# Patient Record
Sex: MALE | Race: WHITE | Employment: FULL TIME | URBAN - METROPOLITAN AREA
[De-identification: names, ages, dates, MRNs, and addresses within clinical notes are randomized per-mention and may not be internally consistent; named-entity substitution may affect disease eponyms.]

---

## 2020-06-12 ENCOUNTER — HOSPITAL ENCOUNTER (OUTPATIENT)
Dept: PREADMISSION TESTING | Age: 60
Discharge: HOME | End: 2020-06-12
Payer: COMMERCIAL

## 2020-06-12 DIAGNOSIS — M47.22 CERVICAL SPONDYLOSIS WITH RADICULOPATHY: ICD-10-CM

## 2020-06-12 LAB
ALBUMIN SERPL-MCNC: 3.7 G/DL (ref 3.4–5)
ALBUMIN/GLOB SERPL: 1.4 {RATIO} (ref 0.8–1.7)
ALP SERPL-CCNC: 59 U/L (ref 45–117)
ALT SERPL-CCNC: 35 U/L (ref 16–61)
ANION GAP SERPL CALC-SCNC: 3 MMOL/L (ref 3–18)
APTT PPP: 25.3 SEC (ref 23–36.4)
AST SERPL-CCNC: 20 U/L (ref 10–38)
ATRIAL RATE: 54 BPM
BILIRUB SERPL-MCNC: 0.4 MG/DL (ref 0.2–1)
BUN SERPL-MCNC: 15 MG/DL (ref 7–18)
BUN/CREAT SERPL: 19 (ref 12–20)
CALCIUM SERPL-MCNC: 8.2 MG/DL (ref 8.5–10.1)
CALCULATED P AXIS, ECG09: 47 DEGREES
CALCULATED R AXIS, ECG10: 48 DEGREES
CALCULATED T AXIS, ECG11: 31 DEGREES
CHLORIDE SERPL-SCNC: 108 MMOL/L (ref 100–111)
CO2 SERPL-SCNC: 29 MMOL/L (ref 21–32)
CREAT SERPL-MCNC: 0.78 MG/DL (ref 0.6–1.3)
DIAGNOSIS, 93000: NORMAL
ERYTHROCYTE [DISTWIDTH] IN BLOOD BY AUTOMATED COUNT: 12.5 % (ref 11.6–14.5)
GLOBULIN SER CALC-MCNC: 2.6 G/DL (ref 2–4)
GLUCOSE SERPL-MCNC: 103 MG/DL (ref 74–99)
HCT VFR BLD AUTO: 42.2 % (ref 36–48)
HGB BLD-MCNC: 13.7 G/DL (ref 13–16)
INR PPP: 1 (ref 0.8–1.2)
MCH RBC QN AUTO: 29.5 PG (ref 24–34)
MCHC RBC AUTO-ENTMCNC: 32.5 G/DL (ref 31–37)
MCV RBC AUTO: 90.8 FL (ref 74–97)
P-R INTERVAL, ECG05: 186 MS
PLATELET # BLD AUTO: 268 K/UL (ref 135–420)
PMV BLD AUTO: 8.8 FL (ref 9.2–11.8)
POTASSIUM SERPL-SCNC: 3.9 MMOL/L (ref 3.5–5.5)
PROT SERPL-MCNC: 6.3 G/DL (ref 6.4–8.2)
PROTHROMBIN TIME: 13.2 SEC (ref 11.5–15.2)
Q-T INTERVAL, ECG07: 416 MS
QRS DURATION, ECG06: 106 MS
QTC CALCULATION (BEZET), ECG08: 394 MS
RBC # BLD AUTO: 4.65 M/UL (ref 4.7–5.5)
SODIUM SERPL-SCNC: 140 MMOL/L (ref 136–145)
VENTRICULAR RATE, ECG03: 54 BPM
WBC # BLD AUTO: 5.1 K/UL (ref 4.6–13.2)

## 2020-06-12 PROCEDURE — 93005 ELECTROCARDIOGRAM TRACING: CPT

## 2020-06-12 PROCEDURE — 36415 COLL VENOUS BLD VENIPUNCTURE: CPT

## 2020-06-12 PROCEDURE — 80053 COMPREHEN METABOLIC PANEL: CPT

## 2020-06-12 PROCEDURE — 85027 COMPLETE CBC AUTOMATED: CPT

## 2020-06-12 PROCEDURE — 85610 PROTHROMBIN TIME: CPT

## 2020-06-12 PROCEDURE — 85730 THROMBOPLASTIN TIME PARTIAL: CPT

## 2020-06-13 LAB
BACTERIA SPEC CULT: NORMAL
BACTERIA SPEC CULT: NORMAL
SERVICE CMNT-IMP: NORMAL

## 2020-06-17 ENCOUNTER — HOSPITAL ENCOUNTER (OUTPATIENT)
Dept: PREADMISSION TESTING | Age: 60
Discharge: HOME | End: 2020-06-17
Payer: COMMERCIAL

## 2020-06-17 PROCEDURE — 87635 SARS-COV-2 COVID-19 AMP PRB: CPT

## 2020-06-18 LAB — SARS-COV-2, COV2NT: NOT DETECTED

## 2020-06-22 PROBLEM — M54.10 RADICULOPATHY OF ARM: Status: ACTIVE | Noted: 2020-06-22

## 2020-06-22 PROBLEM — M48.02 CERVICAL SPINAL STENOSIS: Status: ACTIVE | Noted: 2020-06-22

## 2020-06-22 PROBLEM — M47.812 CERVICAL SPONDYLOSIS: Status: ACTIVE | Noted: 2020-06-22

## 2020-06-22 NOTE — H&P
Patient Name:   Renee Caceres  YOB: 1960      Chief Complaint:  Neck and  upper extremity  pain. History of Chief Complaint:  Amadeo Burciaga presents today. He continues to have problems with his neck, his triceps, his biceps and over the forearm and into the thumb, index finger and middle finger. Previous studies did show significant pathology. We had reported the herniated nucleus pulposus was large at C6-7. It is actually large at C5-6 on the left. There is additional compressive pathology at C6-7 on the left. There is some stenosis, more on the right at the C4-5 level. If he had surgery, it would actually require an anterior cervical discectomy and fusion at C5 through C7. At this point, he feels he has failed conservative care. He does not want to move forward with further options secondary to the perceived weakness and loss of dexterity in the left arm and has not responded to conservative care. I have given him the risks and benefits of an anterior cervical discectomy and stabilization at C5-C7.     Past Medical/Surgical History:    Disease/Disorder Date Side Surgery Date Side Comment   High cholesterol            Discectomy, lumbar 1995        Hand surgery 12/2014        [place] teeth extraction 04/2015       Allergies:    Ingredient Reaction Medication Name Comment   MORPHINE      SHELLFISH DERIVED          Current Medications:    Medication Directions   fluticasone propionate 50 mcg/actuation nasal spray,suspension spray 1 spray by intranasal route  every day in each nostril   gabapentin 100 mg capsule    gabapentin 400 mg capsule    glucosamine-chondroitin 500 mg-400 mg tablet    ibuprofen 600 mg tablet    ibuprofen 800 mg tablet    loratadine 10 mg tablet    methocarbamol 500 mg tablet take 1 tablet by oral route 4 times every day as needed   Naprosyn 500 mg tablet    omeprazole 10 mg capsule,delayed release    rosuvastatin 20 mg tablet take 1 Tablet by oral route  every day   Tylenol Extra Strength 500 mg tablet      Social History:      ALCOHOL  There is a history of alcohol use. Type: Beer. More than 5 glasses consumed weekly. Family History:    Disease Detail Family Member Age Cause of Death Comments   Coronary artery disease Father  N    Coronary artery disease Mother  N    Heart disease Mother  N    Heart disease Father  N    Hypertension Mother  N    Cardiovascular disease Brother  N      Review of Systems:   Pertinent positives include joint pain. Pertinent negatives include weight change and numbness/tingling. Vitals:  Date BP Pulse Temp (F) Resp. (per min.) Height (Total in.) Weight (lbs.) BMI   02/12/2020     74.00  32.10   01/23/2020     74.00  32.10   06/16/2016     74.00  32.10   09/09/2013 161/106    74.00  30.81     Physical Examination:    General:  Patient appears healthy and comfortable with normal developmental signs/age. Cardiovascular:  No significant swelling or edema was noted on inspection and palpation of bilateral upper and lower extremities. Temperature and extremity arterial pulses were normal in bilateral upper and lower limbs. Lymphatic: There was no palpable lymphadenopathy noted on manual evaluation of the neck, axilla, and lower extremity lymph glands. Skin:  Head, neck, trunk, and all four limbs did not reveal abnormal scars or lesions. No contusions, rashes, ulcers, or vascular markings were noted. Eyes: Pupils were noted to be equal, round, and reactive with no evidence of narcotism. Psych: The patient was alert and oriented x3. Affect and mood are normal without signs of anxiety or depression. Musculoskeletal/Neurological:  A full examination of the musculoskeletal organ system was performed and demonstrated full range of motion of all joints of the cervical spine in flexion, extension, side bending, and rotation and in the bilateral upper and lower extremities without instability or subluxation.   No abnormalities of the bones, muscles, or tendons were noted in the cervical spine or upper or lower extremities. Examination of gait and station demonstrated minimal antalgia. Inspection and percussion of the cervical spine demonstrated no misalignment, asymmetry, crepitation, or masses. Pulses were graded at 2+ in all extremities. Deep tendon reflexes were graded at 2/4 in the biceps, triceps, and brachioradialis bilaterally. Muscular evaluation of the deltoid (C5), biceps and wrist extensors (C6), triceps and wrist flexors (C7), finger flexors (C8), and interossei (T1) were noted to be equal bilaterally at 5/5. Mari and Spurling tests were negative. Shoulder exam was negative for pathology bilaterally with negative Neer and Hanson signs and no tenderness over the biceps tendon. The patient was able to heel walk and toe walk without weakness, instability, or abnormality.  strength was equal bilaterally. The patient had negative Phalen, Tinel, and median nerve compression tests. There was no tenderness to palpation over the bony prominences with evident normal tone. Clonus was negative, and Babinski was downgoing. Coordination with rapid movement in the upper and lower extremities revealed no abnormalities. There were no tension signs. Sensation was intact and equal in all dermatomes of the upper and lower extremities. He has tenderness over the cervical paraspinal muscles and bilateral trapezius, left greater than right, that increases with flexion, side bending and rotation to the left. Assessment:   1. Cervical degenerative disc disease, multilevel. 2.  Cervical spinal stenosis, multilevel, greatest C5 through C7.    3.  Large herniated nucleus pulposus, C5-C6, left. 4.  Neural foraminal stenosis, C6-C7, left. 5.  Cervical spondylosis with left upper extremity radiculopathy, primarily C6 and C7 distribution. Recommendation:    At this time, we are going to move forward with an  anterior cervical discectomy and stabilization at C5 through C7. He understands he does have pathology at C4-C5 that may require treatment in the future as well. The risks versus the benefits as well as the alternatives were fully explained to the patient. Risks include, but are not limited to, paralysis, death, heart attack, stroke, pulmonary embolism, deep vein thrombosis, infection, failure to relieve pain, increase in back or arm pain, reherniation, scarring, spinal fluid leak, bowel or bladder dysfunction, bleeding, disease transmission, instrumentation failure, pseudarthrosis, difficulty swallowing, and need for revision surgery. The patient states full understanding of the risks and benefits and wishes to proceed.

## 2020-06-23 ENCOUNTER — HOSPITAL ENCOUNTER (OUTPATIENT)
Age: 60
Setting detail: OUTPATIENT SURGERY
Discharge: HOME | End: 2020-06-23
Attending: ORTHOPAEDIC SURGERY | Admitting: ORTHOPAEDIC SURGERY
Payer: COMMERCIAL

## 2020-06-23 ENCOUNTER — APPOINTMENT (OUTPATIENT)
Dept: GENERAL RADIOLOGY | Age: 60
End: 2020-06-23
Attending: ORTHOPAEDIC SURGERY
Payer: COMMERCIAL

## 2020-06-23 ENCOUNTER — ANESTHESIA (OUTPATIENT)
Dept: SURGERY | Age: 60
End: 2020-06-23
Payer: COMMERCIAL

## 2020-06-23 ENCOUNTER — ANESTHESIA EVENT (OUTPATIENT)
Dept: SURGERY | Age: 60
End: 2020-06-23
Payer: COMMERCIAL

## 2020-06-23 VITALS
SYSTOLIC BLOOD PRESSURE: 164 MMHG | DIASTOLIC BLOOD PRESSURE: 77 MMHG | WEIGHT: 258.56 LBS | OXYGEN SATURATION: 96 % | BODY MASS INDEX: 33.18 KG/M2 | TEMPERATURE: 97.9 F | HEIGHT: 74 IN | HEART RATE: 73 BPM | RESPIRATION RATE: 16 BRPM

## 2020-06-23 DIAGNOSIS — M48.02 CERVICAL SPINAL STENOSIS: Primary | ICD-10-CM

## 2020-06-23 LAB
ABO + RH BLD: NORMAL
BLOOD GROUP ANTIBODIES SERPL: NORMAL
SPECIMEN EXP DATE BLD: NORMAL

## 2020-06-23 PROCEDURE — C1713 ANCHOR/SCREW BN/BN,TIS/BN: HCPCS | Performed by: ORTHOPAEDIC SURGERY

## 2020-06-23 PROCEDURE — 77030020268 HC MISC GENERAL SUPPLY: Performed by: ORTHOPAEDIC SURGERY

## 2020-06-23 PROCEDURE — 74011000272 HC RX REV CODE- 272: Performed by: ORTHOPAEDIC SURGERY

## 2020-06-23 PROCEDURE — 76210000021 HC REC RM PH II 0.5 TO 1 HR: Performed by: ORTHOPAEDIC SURGERY

## 2020-06-23 PROCEDURE — 74011250637 HC RX REV CODE- 250/637: Performed by: PHYSICIAN ASSISTANT

## 2020-06-23 PROCEDURE — 74011000258 HC RX REV CODE- 258: Performed by: NURSE ANESTHETIST, CERTIFIED REGISTERED

## 2020-06-23 PROCEDURE — 77030018836 HC SOL IRR NACL ICUM -A: Performed by: ORTHOPAEDIC SURGERY

## 2020-06-23 PROCEDURE — 77030040361 HC SLV COMPR DVT MDII -B: Performed by: ORTHOPAEDIC SURGERY

## 2020-06-23 PROCEDURE — 74011250636 HC RX REV CODE- 250/636: Performed by: ORTHOPAEDIC SURGERY

## 2020-06-23 PROCEDURE — 74011250636 HC RX REV CODE- 250/636: Performed by: NURSE ANESTHETIST, CERTIFIED REGISTERED

## 2020-06-23 PROCEDURE — 74011000250 HC RX REV CODE- 250: Performed by: ORTHOPAEDIC SURGERY

## 2020-06-23 PROCEDURE — 77030034475 HC MISC IMPL SPN: Performed by: ORTHOPAEDIC SURGERY

## 2020-06-23 PROCEDURE — 74011250636 HC RX REV CODE- 250/636: Performed by: ANESTHESIOLOGY

## 2020-06-23 PROCEDURE — 77030006643: Performed by: ANESTHESIOLOGY

## 2020-06-23 PROCEDURE — 77030003028 HC SUT VCRL J&J -A: Performed by: ORTHOPAEDIC SURGERY

## 2020-06-23 PROCEDURE — 77030027714 HC DRN WND KT TLS STRY -B: Performed by: ORTHOPAEDIC SURGERY

## 2020-06-23 PROCEDURE — 76010000131 HC OR TIME 2 TO 2.5 HR: Performed by: ORTHOPAEDIC SURGERY

## 2020-06-23 PROCEDURE — 74011000250 HC RX REV CODE- 250: Performed by: NURSE ANESTHETIST, CERTIFIED REGISTERED

## 2020-06-23 PROCEDURE — 36415 COLL VENOUS BLD VENIPUNCTURE: CPT

## 2020-06-23 PROCEDURE — 77030020782 HC GWN BAIR PAWS FLX 3M -B: Performed by: ORTHOPAEDIC SURGERY

## 2020-06-23 PROCEDURE — 77030010507 HC ADH SKN DERMBND J&J -B: Performed by: ORTHOPAEDIC SURGERY

## 2020-06-23 PROCEDURE — 77030018390 HC SPNG HEMSTAT2 J&J -B: Performed by: ORTHOPAEDIC SURGERY

## 2020-06-23 PROCEDURE — 76060000035 HC ANESTHESIA 2 TO 2.5 HR: Performed by: ORTHOPAEDIC SURGERY

## 2020-06-23 PROCEDURE — 86900 BLOOD TYPING SEROLOGIC ABO: CPT

## 2020-06-23 PROCEDURE — 77030008477 HC STYL SATN SLP COVD -A: Performed by: ANESTHESIOLOGY

## 2020-06-23 PROCEDURE — 74011250637 HC RX REV CODE- 250/637: Performed by: ANESTHESIOLOGY

## 2020-06-23 PROCEDURE — 77030008683 HC TU ET CUF COVD -A: Performed by: ANESTHESIOLOGY

## 2020-06-23 PROCEDURE — 76210000016 HC OR PH I REC 1 TO 1.5 HR: Performed by: ORTHOPAEDIC SURGERY

## 2020-06-23 PROCEDURE — 77030033138 HC SUT PGA STRATFX J&J -B: Performed by: ORTHOPAEDIC SURGERY

## 2020-06-23 DEVICE — NANOFUSE DBM 2CC SZ: Type: IMPLANTABLE DEVICE | Site: SPINE CERVICAL | Status: FUNCTIONAL

## 2020-06-23 RX ORDER — ONDANSETRON 2 MG/ML
INJECTION INTRAMUSCULAR; INTRAVENOUS AS NEEDED
Status: DISCONTINUED | OUTPATIENT
Start: 2020-06-23 | End: 2020-06-23 | Stop reason: HOSPADM

## 2020-06-23 RX ORDER — CIPROFLOXACIN 750 MG/1
750 TABLET, FILM COATED ORAL EVERY 12 HOURS
Qty: 14 TAB | Refills: 0 | Status: SHIPPED | OUTPATIENT
Start: 2020-06-23 | End: 2020-06-30

## 2020-06-23 RX ORDER — SODIUM CHLORIDE, SODIUM LACTATE, POTASSIUM CHLORIDE, CALCIUM CHLORIDE 600; 310; 30; 20 MG/100ML; MG/100ML; MG/100ML; MG/100ML
150 INJECTION, SOLUTION INTRAVENOUS CONTINUOUS
Status: DISCONTINUED | OUTPATIENT
Start: 2020-06-23 | End: 2020-06-23 | Stop reason: HOSPADM

## 2020-06-23 RX ORDER — ONDANSETRON 2 MG/ML
4 INJECTION INTRAMUSCULAR; INTRAVENOUS ONCE
Status: DISCONTINUED | OUTPATIENT
Start: 2020-06-23 | End: 2020-06-23 | Stop reason: HOSPADM

## 2020-06-23 RX ORDER — METHOCARBAMOL 500 MG/1
500 TABLET, FILM COATED ORAL 3 TIMES DAILY
Qty: 60 TAB | Refills: 0 | Status: SHIPPED | OUTPATIENT
Start: 2020-06-23

## 2020-06-23 RX ORDER — DIPHENHYDRAMINE HYDROCHLORIDE 50 MG/ML
12.5 INJECTION, SOLUTION INTRAMUSCULAR; INTRAVENOUS
Status: DISCONTINUED | OUTPATIENT
Start: 2020-06-23 | End: 2020-06-23 | Stop reason: HOSPADM

## 2020-06-23 RX ORDER — SODIUM CHLORIDE 0.9 % (FLUSH) 0.9 %
5-40 SYRINGE (ML) INJECTION EVERY 8 HOURS
Status: DISCONTINUED | OUTPATIENT
Start: 2020-06-23 | End: 2020-06-23 | Stop reason: HOSPADM

## 2020-06-23 RX ORDER — OXYCODONE AND ACETAMINOPHEN 5; 325 MG/1; MG/1
1 TABLET ORAL
Qty: 30 TAB | Refills: 0 | Status: SHIPPED | OUTPATIENT
Start: 2020-06-23 | End: 2020-07-01

## 2020-06-23 RX ORDER — HYDROMORPHONE HYDROCHLORIDE 2 MG/ML
INJECTION, SOLUTION INTRAMUSCULAR; INTRAVENOUS; SUBCUTANEOUS AS NEEDED
Status: DISCONTINUED | OUTPATIENT
Start: 2020-06-23 | End: 2020-06-23 | Stop reason: HOSPADM

## 2020-06-23 RX ORDER — SODIUM CHLORIDE, SODIUM LACTATE, POTASSIUM CHLORIDE, CALCIUM CHLORIDE 600; 310; 30; 20 MG/100ML; MG/100ML; MG/100ML; MG/100ML
125 INJECTION, SOLUTION INTRAVENOUS CONTINUOUS
Status: DISCONTINUED | OUTPATIENT
Start: 2020-06-23 | End: 2020-06-23 | Stop reason: HOSPADM

## 2020-06-23 RX ORDER — ACETAMINOPHEN 500 MG
1000 TABLET ORAL
Status: COMPLETED | OUTPATIENT
Start: 2020-06-23 | End: 2020-06-23

## 2020-06-23 RX ORDER — INSULIN LISPRO 100 [IU]/ML
INJECTION, SOLUTION INTRAVENOUS; SUBCUTANEOUS ONCE
Status: DISCONTINUED | OUTPATIENT
Start: 2020-06-23 | End: 2020-06-23 | Stop reason: HOSPADM

## 2020-06-23 RX ORDER — SODIUM CHLORIDE 0.9 % (FLUSH) 0.9 %
5-40 SYRINGE (ML) INJECTION AS NEEDED
Status: DISCONTINUED | OUTPATIENT
Start: 2020-06-23 | End: 2020-06-23 | Stop reason: HOSPADM

## 2020-06-23 RX ORDER — CELECOXIB 100 MG/1
200 CAPSULE ORAL
Status: CANCELLED | OUTPATIENT
Start: 2020-06-23 | End: 2020-06-23

## 2020-06-23 RX ORDER — NALOXONE HYDROCHLORIDE 0.4 MG/ML
0.1 INJECTION, SOLUTION INTRAMUSCULAR; INTRAVENOUS; SUBCUTANEOUS AS NEEDED
Status: DISCONTINUED | OUTPATIENT
Start: 2020-06-23 | End: 2020-06-23 | Stop reason: HOSPADM

## 2020-06-23 RX ORDER — ALBUTEROL SULFATE 0.83 MG/ML
2.5 SOLUTION RESPIRATORY (INHALATION) AS NEEDED
Status: DISCONTINUED | OUTPATIENT
Start: 2020-06-23 | End: 2020-06-23 | Stop reason: HOSPADM

## 2020-06-23 RX ORDER — HYDROCODONE BITARTRATE AND ACETAMINOPHEN 5; 325 MG/1; MG/1
1 TABLET ORAL AS NEEDED
Status: DISCONTINUED | OUTPATIENT
Start: 2020-06-23 | End: 2020-06-23 | Stop reason: HOSPADM

## 2020-06-23 RX ORDER — HYDROMORPHONE HYDROCHLORIDE 1 MG/ML
0.2 INJECTION, SOLUTION INTRAMUSCULAR; INTRAVENOUS; SUBCUTANEOUS AS NEEDED
Status: DISCONTINUED | OUTPATIENT
Start: 2020-06-23 | End: 2020-06-23 | Stop reason: HOSPADM

## 2020-06-23 RX ORDER — OXYCODONE AND ACETAMINOPHEN 5; 325 MG/1; MG/1
1 TABLET ORAL
Status: DISCONTINUED | OUTPATIENT
Start: 2020-06-23 | End: 2020-06-23 | Stop reason: HOSPADM

## 2020-06-23 RX ORDER — DEXAMETHASONE SODIUM PHOSPHATE 4 MG/ML
INJECTION, SOLUTION INTRA-ARTICULAR; INTRALESIONAL; INTRAMUSCULAR; INTRAVENOUS; SOFT TISSUE AS NEEDED
Status: DISCONTINUED | OUTPATIENT
Start: 2020-06-23 | End: 2020-06-23 | Stop reason: HOSPADM

## 2020-06-23 RX ORDER — LIDOCAINE HYDROCHLORIDE 20 MG/ML
INJECTION, SOLUTION EPIDURAL; INFILTRATION; INTRACAUDAL; PERINEURAL AS NEEDED
Status: DISCONTINUED | OUTPATIENT
Start: 2020-06-23 | End: 2020-06-23 | Stop reason: HOSPADM

## 2020-06-23 RX ORDER — MIDAZOLAM HYDROCHLORIDE 1 MG/ML
INJECTION, SOLUTION INTRAMUSCULAR; INTRAVENOUS AS NEEDED
Status: DISCONTINUED | OUTPATIENT
Start: 2020-06-23 | End: 2020-06-23 | Stop reason: HOSPADM

## 2020-06-23 RX ORDER — DEXTROSE MONOHYDRATE 100 MG/ML
125-250 INJECTION, SOLUTION INTRAVENOUS AS NEEDED
Status: DISCONTINUED | OUTPATIENT
Start: 2020-06-23 | End: 2020-06-23 | Stop reason: HOSPADM

## 2020-06-23 RX ORDER — PROPOFOL 10 MG/ML
INJECTION, EMULSION INTRAVENOUS AS NEEDED
Status: DISCONTINUED | OUTPATIENT
Start: 2020-06-23 | End: 2020-06-23 | Stop reason: HOSPADM

## 2020-06-23 RX ORDER — FENTANYL CITRATE 50 UG/ML
INJECTION, SOLUTION INTRAMUSCULAR; INTRAVENOUS AS NEEDED
Status: DISCONTINUED | OUTPATIENT
Start: 2020-06-23 | End: 2020-06-23 | Stop reason: HOSPADM

## 2020-06-23 RX ORDER — MAGNESIUM SULFATE 100 %
4 CRYSTALS MISCELLANEOUS AS NEEDED
Status: DISCONTINUED | OUTPATIENT
Start: 2020-06-23 | End: 2020-06-23 | Stop reason: HOSPADM

## 2020-06-23 RX ORDER — CEFAZOLIN SODIUM 2 G/50ML
2 SOLUTION INTRAVENOUS ONCE
Status: COMPLETED | OUTPATIENT
Start: 2020-06-23 | End: 2020-06-23

## 2020-06-23 RX ORDER — FENTANYL CITRATE 50 UG/ML
25 INJECTION, SOLUTION INTRAMUSCULAR; INTRAVENOUS
Status: DISCONTINUED | OUTPATIENT
Start: 2020-06-23 | End: 2020-06-23 | Stop reason: HOSPADM

## 2020-06-23 RX ORDER — PREGABALIN 100 MG/1
100 CAPSULE ORAL
Status: COMPLETED | OUTPATIENT
Start: 2020-06-23 | End: 2020-06-23

## 2020-06-23 RX ORDER — METHOCARBAMOL 500 MG/1
750 TABLET, FILM COATED ORAL 4 TIMES DAILY
Status: DISCONTINUED | OUTPATIENT
Start: 2020-06-23 | End: 2020-06-23 | Stop reason: HOSPADM

## 2020-06-23 RX ORDER — ROCURONIUM BROMIDE 10 MG/ML
INJECTION, SOLUTION INTRAVENOUS AS NEEDED
Status: DISCONTINUED | OUTPATIENT
Start: 2020-06-23 | End: 2020-06-23 | Stop reason: HOSPADM

## 2020-06-23 RX ADMIN — MIDAZOLAM 2 MG: 1 INJECTION INTRAMUSCULAR; INTRAVENOUS at 07:43

## 2020-06-23 RX ADMIN — SODIUM CHLORIDE, SODIUM LACTATE, POTASSIUM CHLORIDE, AND CALCIUM CHLORIDE 125 ML/HR: 600; 310; 30; 20 INJECTION, SOLUTION INTRAVENOUS at 06:39

## 2020-06-23 RX ADMIN — LIDOCAINE HYDROCHLORIDE 60 MG: 20 INJECTION, SOLUTION EPIDURAL; INFILTRATION; INTRACAUDAL; PERINEURAL at 07:51

## 2020-06-23 RX ADMIN — SUGAMMADEX 200 MG: 100 INJECTION, SOLUTION INTRAVENOUS at 09:40

## 2020-06-23 RX ADMIN — DEXMEDETOMIDINE HYDROCHLORIDE 4 MCG: 100 INJECTION, SOLUTION INTRAVENOUS at 08:22

## 2020-06-23 RX ADMIN — FENTANYL CITRATE 25 MCG: 50 INJECTION, SOLUTION INTRAMUSCULAR; INTRAVENOUS at 10:42

## 2020-06-23 RX ADMIN — PREGABALIN 100 MG: 100 CAPSULE ORAL at 07:07

## 2020-06-23 RX ADMIN — HYDROMORPHONE HYDROCHLORIDE 1 MG: 2 INJECTION, SOLUTION INTRAMUSCULAR; INTRAVENOUS; SUBCUTANEOUS at 08:07

## 2020-06-23 RX ADMIN — DEXMEDETOMIDINE HYDROCHLORIDE 4 MCG: 100 INJECTION, SOLUTION INTRAVENOUS at 08:27

## 2020-06-23 RX ADMIN — ONDANSETRON HYDROCHLORIDE 4 MG: 2 INJECTION INTRAMUSCULAR; INTRAVENOUS at 08:07

## 2020-06-23 RX ADMIN — FENTANYL CITRATE 25 MCG: 50 INJECTION, SOLUTION INTRAMUSCULAR; INTRAVENOUS at 10:57

## 2020-06-23 RX ADMIN — DEXAMETHASONE SODIUM PHOSPHATE 4 MG: 4 INJECTION, SOLUTION INTRAMUSCULAR; INTRAVENOUS at 08:07

## 2020-06-23 RX ADMIN — CEFAZOLIN SODIUM 2 G: 2 SOLUTION INTRAVENOUS at 08:03

## 2020-06-23 RX ADMIN — FENTANYL CITRATE 25 MCG: 50 INJECTION, SOLUTION INTRAMUSCULAR; INTRAVENOUS at 10:21

## 2020-06-23 RX ADMIN — SODIUM CHLORIDE, SODIUM LACTATE, POTASSIUM CHLORIDE, AND CALCIUM CHLORIDE 150 ML/HR: 600; 310; 30; 20 INJECTION, SOLUTION INTRAVENOUS at 10:11

## 2020-06-23 RX ADMIN — ROCURONIUM BROMIDE 50 MG: 10 INJECTION, SOLUTION INTRAVENOUS at 07:51

## 2020-06-23 RX ADMIN — MIDAZOLAM 2 MG: 1 INJECTION INTRAMUSCULAR; INTRAVENOUS at 07:29

## 2020-06-23 RX ADMIN — FENTANYL CITRATE 100 MCG: 50 INJECTION, SOLUTION INTRAMUSCULAR; INTRAVENOUS at 07:51

## 2020-06-23 RX ADMIN — ACETAMINOPHEN 1000 MG: 500 TABLET ORAL at 07:07

## 2020-06-23 RX ADMIN — PROPOFOL 200 MG: 10 INJECTION, EMULSION INTRAVENOUS at 07:51

## 2020-06-23 RX ADMIN — HYDROMORPHONE HYDROCHLORIDE 1 MG: 2 INJECTION, SOLUTION INTRAMUSCULAR; INTRAVENOUS; SUBCUTANEOUS at 08:13

## 2020-06-23 RX ADMIN — OXYCODONE HYDROCHLORIDE AND ACETAMINOPHEN 1 TABLET: 5; 325 TABLET ORAL at 11:26

## 2020-06-23 NOTE — ANESTHESIA PREPROCEDURE EVALUATION
Relevant Problems   No relevant active problems       Anesthetic History   No history of anesthetic complications            Review of Systems / Medical History  Patient summary reviewed, nursing notes reviewed and pertinent labs reviewed    Pulmonary  Within defined limits                 Neuro/Psych   Within defined limits           Cardiovascular                  Exercise tolerance: >4 METS     GI/Hepatic/Renal     GERD           Endo/Other        Arthritis     Other Findings              Physical Exam    Airway  Mallampati: II  TM Distance: 4 - 6 cm  Neck ROM: normal range of motion   Mouth opening: Normal     Cardiovascular  Regular rate and rhythm,  S1 and S2 normal,  no murmur, click, rub, or gallop             Dental  No notable dental hx       Pulmonary  Breath sounds clear to auscultation               Abdominal  GI exam deferred       Other Findings            Anesthetic Plan    ASA: 2  Anesthesia type: general          Induction: Intravenous  Anesthetic plan and risks discussed with: Patient

## 2020-06-23 NOTE — DISCHARGE INSTRUCTIONS
Patient Education        9 Things To Do If You've Been Exposed to COVID-19    Stay home. If you've been exposed, you should stay in isolation for 14 days. Don't go to school, work, or public areas. And don't use public transportation, ride-shares, or taxis unless you have no choice. Leave your home only if you need to get medical care. But call the doctor's office first so they know you're coming, and wear a cloth face cover when you go. Call your doctor. Call your doctor or other health professional to let them know that you've been exposed. They might want you to be tested, or they may have other instructions for you. If you become sick, wear a face cover when you are around other people. It can help stop the spread of the virus when you cough or sneeze. Limit contact with people in your home. If possible, stay in a separate bedroom and use a separate bathroom. Avoid contact with pets and other animals. Cover your mouth and nose with a tissue when you cough or sneeze. Then throw it in the trash right away. Wash your hands often, especially after you cough or sneeze. Use soap and water, and scrub for at least 20 seconds. If soap and water aren't available, use an alcohol-based hand . Don't share personal household items. These include bedding, towels, cups and glasses, and eating utensils. Clean and disinfect your home every day. Use household  or disinfectant wipes or sprays. Take special care to clean things that you grab with your hands. These include doorknobs, remote controls, phones, and handles on your refrigerator and microwave. And don't forget countertops, tabletops, bathrooms, and computer keyboards. Current as of: May 8, 2020               Content Version: 12.5  © 2006-2020 [employer]. Care instructions adapted under license by [employer] (which disclaims liability or warranty for this information).  If you have questions about a medical condition or this instruction, always ask your healthcare professional. [address] any warranty or liability for your use of this information. DISCHARGE SUMMARY from Nurse    PATIENT INSTRUCTIONS:    After general anesthesia or intravenous sedation, for 24 hours or while taking prescription Narcotics:  · Limit your activities  · Do not drive and operate hazardous machinery  · Do not make important personal or business decisions  · Do  not drink alcoholic beverages  · If you have not urinated within 8 hours after discharge, please contact your surgeon on call. Report the following to your surgeon:  · Excessive pain, swelling, redness or odor of or around the surgical area  · Temperature over 100.5  · Nausea and vomiting lasting longer than 4 hours or if unable to take medications  · Any signs of decreased circulation or nerve impairment to extremity: change in color, persistent  numbness, tingling, coldness or increase pain  · Any questions    What to do at Home:  [address] IN 10 DAYS CALL FOR APPT    If you experience any of the following symptoms heavy bleeding, fevers, severe pain, circulation changes, please follow up with dr Allyson Do    *  Please give a list of your current medications to your Primary Care Provider. *  Please update this list whenever your medications are discontinued, doses are      changed, or new medications (including over-the-counter products) are added. *  Please carry medication information at all times in case of emergency situations. These are general instructions for a healthy lifestyle:    No smoking/ No tobacco products/ Avoid exposure to second hand smoke  Surgeon General's Warning:  Quitting smoking now greatly reduces serious risk to your health.     Obesity, smoking, and sedentary lifestyle greatly increases your risk for illness    A healthy diet, regular physical exercise & weight monitoring are important for maintaining a healthy lifestyle    You may be retaining fluid if you have a history of heart failure or if you experience any of the following symptoms:  Weight gain of 3 pounds or more overnight or 5 pounds in a week, increased swelling in our hands or feet or shortness of breath while lying flat in bed. Please call your doctor as soon as you notice any of these symptoms; do not wait until your next office visit. The discharge information has been reviewed with the patient and caregiver. The patient and caregiver verbalized understanding. Discharge medications reviewed with the patient and caregiver and appropriate educational materials and side effects teaching were provided. ___________________________________________________________________________________________________________________________________      Dr. Adonay May Operative Instructions: Cervical Fusion    *YOU MUST AVOID SMOKING OR BEING AROUND ANYONE WHO SMOKES. AVOID ALL PRODUCTS THAT CONTAIN NICOTINE. DO NOT TAKE IBUPROFEN OR ANTI--INFLAMMATORIES, AS THESE MAY ALTER THE HEALING OF THE FUSION. Diet:   1. Begin with liquids and light foods such as jell-o or soups  2. Advance as tolerated to your regular diet if not nauseated. 3.  Swallowing difficulties may be experienced up to 2 weeks post-operatively. Modify food thickness as necessary. You may also obtain over-the-counter chloraseptic spray. Medications:  1. Strong oral narcotic pain medications have been prescribed for the first few days. Use only as directed. No pain medication is capable of taking away all the pain. Taking your pills at regular intervals will give you the best chance of having less pain. 2. If you need a refill PLEASE PLAN AHEAD. Call our office during regular hours (8-5). 3. Do not combine with alcoholic beverages.   4. Be careful as you walk, climb stairs or drive as mild dizziness is not unusual.  5. Do not take medications that have not been prescribed by your surgeon. 6. You may switch to over the counter pain medication of your choice as you become more comfortable. Activity and Restrictions:  1. You should not drive until you are clear by your surgeon at your post-operative visit. 2.  In regards to your cervical collar, you MUST wear this at all times until your first follow-up appointment. 3.  You should wear the collar even when sleeping. 4.  It can be removed for short periods of time as long as you are keeping your head centered over the shoulders without rotating or looking up or down. 5. You may take short walks inside and outside of your home and climb stairs. 6. You are to avoid work, housework, yard work, snow shoveling, lifting of more than a few pounds, overhead work or strenuous activity. 7. Avoid any excessive stretching or range-of-motion of the neck. Non-painful range-of-motion of the neck is allowed  8. Follow-up with Dr. Corinna Lopez in 7-10 days. DRIVIN. You should not drive until after your follow-up appointment. 2.  You can be in a vehicle for short distances, but if you travel any long distance, please stop about every 30 minutes and walk/stretch. 3.  You should NEVER drive while taking narcotic medication. BATHING and INCISION CARE:  1. The incision may be tender to touch or feel numb: this is normal.   2.  Keep the incision clean and dry no showering until your follow-up appointment. The incision will be closed with sutures under the skin and the skin will be glued. 3.  Do not apply any lotions, ointments or oils on the incision. 4.  If you notice any excessive swelling, redness, or persistent drainage around the incision, notify the office immediately. RETURN TO WORK :  1. The decision to return to work will be determined on an individual basis.    2.  Many people who have a strenuous job (construction, heavy labor, etc) may need to be off work for up to 8 weeks. 3.  If you need a work note, please let us know as soon as possible, and not the same day you are planning to return to work. NUTRITION :  1.  Good nutrition is an essential part of healing. 2.  You should eat a balanced diet each day, including fruits, vegetables, dairy products and protein. 3.  Remember to drink plenty of water. 4.  If you have not had a bowel movement within 3 days of surgery, you will need to use a laxative or suppository that can be obtained over-the-counter at your local pharmacy. BONE STIMULATOR:  1. A spinal bone stimulator may be prescribed for you under certain situations. 2.  A nurse will call you if one has been requested and discuss its use for approximately 4-6 months post-op every day. 3.  This device assists in bone healing and fusion. CALL THE OFFICE:   If you have severe pain unrelieved by the medications, new numbness or tingling in your arms;   If you have a fever of 101.0°F or greater;    If you notice excessive swelling, redness, or persistent drainage from the incision or IV site; [facility] office number is [phone] from 8:00am to 5:00pm Monday through Friday. After 5:00pm, on weekends, or holidays, please leave a message with our answering service and the doctor on-call will get back to you shortly.       Patient armband removed and shredded

## 2020-06-23 NOTE — OP NOTES
OPERATIVE NOTE    Patient: Seymour Gonzalez MRN: [id number]  SSN: [id number]    YOB: 1960  Age: 56 y.o. Sex: male      Indications: This is a 56y.o. year-old male who presents with neck pain. He was positive for stenosis per MRI. The patient was admitted for surgery as conservative measures have failed. Date of Procedure: 6/23/2020     Preoperative Diagnosis: SPONDYLOSIS WITH RADICULOPATHY CERVICAL REGION    Postoperative Diagnosis: SPONDYLOSIS WITH RADICULOPATHY CERVICAL REGION      Procedure: Procedure(s):  ANTERIOR CERVICAL DISCETOMY AND FUSION, ASTURA ANTHONY AND ETCHED TITANIUIM CERVICAL INTERBODY CERVICAL 5- CERVICAL 7 WITH C-ARM    Surgeon:  Fabby Jimenez DO ,Surgical Assistant: Jacey Eaton PA-C    Assistant: Torie Bailon: Erica Love RN  Circ-2: Julio Cesar Watson  Circ-Relief: Bao Zaman RN  Physician Assistant: Chelsi Anguiano PA-C  Radiology Technician: Ellis Lemus  Scrub Tech-1: Yasmin Bender  Nurse Practitioner: Titus Crane NP  Float Staff: [facility]    Anesthesia: general    Estimated Blood Loss: * No values recorded between 6/23/2020  8:11 AM and 6/23/2020  9:27 AM *    Specimens: * No specimens in log *     Drains: tls    Implants:   Implant Name Type Inv. Item Serial No.  Lot No. LRB No. Used Action   PUTTY DBM BIOACTIVE MTRX 2CC -- NANOFUSE - [id number]  PUTTY 600 [facility] -- [address] [place], [place] [id number] N/A 1 Implanted       Complications: None; patient tolerated the procedure well. Procedure: The patient was greeted by Anesthesia and taken to the operative suite, where the patient underwent general endotracheal anesthesia. The patient was positioned in the supine positioned in the supine position on a standard radiolucent Mejia spine table. The head was held in 5 pounds of traction through the mandible and the occiput, utilizing a Holter apparatus.   A towel roll was placed between the patient's shoulders to allow gentle extension of the cervical spine, and the arms were held at the patient's side. There cervical spine was sterilely prepped and draped in a standard fashion. Fluoroscopy confirmed the Cervical level 5-7, and a 2inch incision was made over the left anterior cervical spine in line with the disk space. The platysma was transected and undermined. The investing fascia over the medial border of the sternocleidomastoid muscle was sharply dissected. Blunt dissection was utilized to palpate the carotid pulsation and to dissect over the osteophytes of the cervical spine. The longus coli muscles were mobilized with electrocautery and retractors were positioned to protect the soft tissue and neurovascular structures. A needle was positioned in the Cervical 5-7disk space and confirmed fluoroscopically to be the appropriate level. A rongeur was utilized to remove all osteophytes over the anterior border of Cervical level 5-7. A complete diskectomy was performed at Cervical level 5-7for purposes of decompression, secondary to spinal stenosis, utilizing a combination of pituitary rongeurs, Kerrison rongeurs, and curettes. There was a massive herniation at C5-6 left and moderate to large at C6-7 left. The curettes were utilized to completely remove all cartilage from the superior and the inferior endplate to expose the underlying endplate and create a bleeding surface. The posterior uncinate spurs were completely resect ed, as well as a complete resection of the posterior longitudinal ligament. At the completion of the decompression, a nerve hook could be passed out each neural foramen. There was no residual stenosis noted. The disk space had been squared off and rasped. Progressive trial spacers were positioned at each level for trial.  The most appropriate height and width cage was placed. An appropriate width and sized acid etched titanium interbody threaded machine interbody cages was chosen.   The Nanofuse Bioglass/DBM graft was placed inside the acid etched titanium interbody cage, and subsequently impacted into the Cervical 5-7 disk space for disk height restoration, lordosis correction, and interbody fusion. My Physician Assistant was utilized as a co-surgeon for this case to include vascular retraction, suction, graft sizing, screw placement, irrigation and final closure of surgical incision. This assistance was instrumental to the safety and success of this surgical case. X-rays confirmed excellent position of the interbody cage. A 32mm Astura Lansing anterior cervical locking plate was contoured to the cervical spine and locked into position with 6 locking screws in Cervical level 5-7. Xray's confirmed excellent position of the locking plate without abnormalities. All bleeding was cauterized with bipolar electrocautery and hemostatic agents. A deep TLS drain was placed. The platysma was re approximated with 2-0 Vicryl suture. The skin edges were re approximated with 2-0 Vicryl in subcutaneous fashion, and the skin was closed with a running 3-0 Monocryl in subcuticular fashion. A layer of Dermabond skin sealant was placed, as well as a soft sterile dressing and a hard cervical collar. The patient recovered from anesthesia and was transferred to the postanesthesia care unit in stable condition.       Khris Dykes MD  6/23/2020  9:27 AM

## 2020-06-23 NOTE — PERIOP NOTES
Moving all extremities without difficulty. Pt instructed on the exchanging of tls drain.  Discharge instructions given to spouse via phone

## 2020-06-23 NOTE — ANESTHESIA POSTPROCEDURE EVALUATION
Procedure(s):  ANTERIOR CERVICAL DISCETOMY AND FUSION, ASTURA ANTHONY AND ETCHED TITANIUIM CERVICAL INTERBODY CERVICAL 5- CERVICAL 7 WITH C-ARM. general    Anesthesia Post Evaluation      Multimodal analgesia: multimodal analgesia used between 6 hours prior to anesthesia start to PACU discharge  Patient location during evaluation: PACU  Level of consciousness: awake  Pain management: adequate  Airway patency: patent  Anesthetic complications: no  Cardiovascular status: acceptable  Respiratory status: acceptable  Hydration status: acceptable  Post anesthesia nausea and vomiting:  none  Final Post Anesthesia Temperature Assessment:  Normothermia (36.0-37.5 degrees C)      INITIAL Post-op Vital signs:   Vitals Value Taken Time   /81 6/23/2020 10:50 AM   Temp 36.3 °C (97.4 °F) 6/23/2020  9:56 AM   Pulse 82 6/23/2020 10:52 AM   Resp 13 6/23/2020 10:52 AM   SpO2 97 % 6/23/2020 10:52 AM   Vitals shown include unvalidated device data.

## 2022-03-18 PROBLEM — M54.10 RADICULOPATHY OF ARM: Status: ACTIVE | Noted: 2020-06-22

## 2022-03-19 PROBLEM — M47.812 CERVICAL SPONDYLOSIS: Status: ACTIVE | Noted: 2020-06-22

## 2022-03-20 PROBLEM — M48.02 CERVICAL SPINAL STENOSIS: Status: ACTIVE | Noted: 2020-06-22

## 2022-12-09 RX ORDER — SODIUM CHLORIDE 0.9 % (FLUSH) 0.9 %
5-40 SYRINGE (ML) INJECTION AS NEEDED
Status: CANCELLED | OUTPATIENT
Start: 2022-12-09

## 2022-12-09 RX ORDER — ATROPINE SULFATE 0.1 MG/ML
0.5 INJECTION INTRAVENOUS
Status: CANCELLED | OUTPATIENT
Start: 2022-12-09 | End: 2022-12-10

## 2022-12-09 RX ORDER — EPINEPHRINE 0.1 MG/ML
1 INJECTION INTRACARDIAC; INTRAVENOUS
Status: CANCELLED | OUTPATIENT
Start: 2022-12-09 | End: 2022-12-10

## 2022-12-09 RX ORDER — DIPHENHYDRAMINE HYDROCHLORIDE 50 MG/ML
50 INJECTION, SOLUTION INTRAMUSCULAR; INTRAVENOUS ONCE
Status: CANCELLED | OUTPATIENT
Start: 2022-12-09 | End: 2022-12-09

## 2022-12-09 RX ORDER — DEXTROMETHORPHAN/PSEUDOEPHED 2.5-7.5/.8
1.2 DROPS ORAL
Status: CANCELLED | OUTPATIENT
Start: 2022-12-09

## 2022-12-09 RX ORDER — SODIUM CHLORIDE 0.9 % (FLUSH) 0.9 %
5-40 SYRINGE (ML) INJECTION EVERY 8 HOURS
Status: CANCELLED | OUTPATIENT
Start: 2022-12-09

## 2022-12-20 ENCOUNTER — HOSPITAL ENCOUNTER (OUTPATIENT)
Age: 62
Setting detail: OUTPATIENT SURGERY
Discharge: HOME | End: 2022-12-20
Attending: INTERNAL MEDICINE | Admitting: INTERNAL MEDICINE
Payer: COMMERCIAL

## 2022-12-20 VITALS
BODY MASS INDEX: 32.71 KG/M2 | OXYGEN SATURATION: 96 % | WEIGHT: 254.9 LBS | SYSTOLIC BLOOD PRESSURE: 134 MMHG | HEART RATE: 74 BPM | HEIGHT: 74 IN | RESPIRATION RATE: 16 BRPM | DIASTOLIC BLOOD PRESSURE: 76 MMHG | TEMPERATURE: 98.4 F

## 2022-12-20 PROCEDURE — 74011250636 HC RX REV CODE- 250/636: Performed by: INTERNAL MEDICINE

## 2022-12-20 PROCEDURE — 77030040361 HC SLV COMPR DVT MDII -B: Performed by: INTERNAL MEDICINE

## 2022-12-20 PROCEDURE — 88305 TISSUE EXAM BY PATHOLOGIST: CPT

## 2022-12-20 PROCEDURE — 77030003657 HC NDL SCLER BSC -B: Performed by: INTERNAL MEDICINE

## 2022-12-20 PROCEDURE — 76040000008: Performed by: INTERNAL MEDICINE

## 2022-12-20 PROCEDURE — G0500 MOD SEDAT ENDO SERVICE >5YRS: HCPCS | Performed by: INTERNAL MEDICINE

## 2022-12-20 PROCEDURE — 2709999900 HC NON-CHARGEABLE SUPPLY: Performed by: INTERNAL MEDICINE

## 2022-12-20 PROCEDURE — 99153 MOD SED SAME PHYS/QHP EA: CPT | Performed by: INTERNAL MEDICINE

## 2022-12-20 PROCEDURE — 77030013991 HC SNR POLYP ENDOSC BSC -A: Performed by: INTERNAL MEDICINE

## 2022-12-20 RX ORDER — FLUMAZENIL 0.1 MG/ML
0.2 INJECTION INTRAVENOUS
Status: DISCONTINUED | OUTPATIENT
Start: 2022-12-20 | End: 2022-12-20 | Stop reason: HOSPADM

## 2022-12-20 RX ORDER — FENTANYL CITRATE 50 UG/ML
100 INJECTION, SOLUTION INTRAMUSCULAR; INTRAVENOUS
Status: DISCONTINUED | OUTPATIENT
Start: 2022-12-20 | End: 2022-12-20 | Stop reason: HOSPADM

## 2022-12-20 RX ORDER — SODIUM CHLORIDE 9 MG/ML
1000 INJECTION, SOLUTION INTRAVENOUS CONTINUOUS
Status: DISCONTINUED | OUTPATIENT
Start: 2022-12-20 | End: 2022-12-20 | Stop reason: HOSPADM

## 2022-12-20 RX ORDER — ROSUVASTATIN CALCIUM 5 MG/1
5 TABLET, COATED ORAL DAILY
COMMUNITY
Start: 2022-12-12

## 2022-12-20 RX ORDER — MIDAZOLAM HYDROCHLORIDE 1 MG/ML
.25-5 INJECTION, SOLUTION INTRAMUSCULAR; INTRAVENOUS
Status: DISCONTINUED | OUTPATIENT
Start: 2022-12-20 | End: 2022-12-20 | Stop reason: HOSPADM

## 2022-12-20 RX ORDER — TADALAFIL 5 MG/1
5 TABLET ORAL
COMMUNITY

## 2022-12-20 RX ORDER — OMEPRAZOLE 10 MG/1
10 CAPSULE, DELAYED RELEASE ORAL DAILY
Status: ON HOLD | COMMUNITY
End: 2022-12-20

## 2022-12-20 RX ORDER — GUAIFENESIN/PHENYLPROPANOLAMIN
EXPECTORANT ORAL
COMMUNITY

## 2022-12-20 RX ORDER — NALOXONE HYDROCHLORIDE 0.4 MG/ML
0.4 INJECTION, SOLUTION INTRAMUSCULAR; INTRAVENOUS; SUBCUTANEOUS
Status: DISCONTINUED | OUTPATIENT
Start: 2022-12-20 | End: 2022-12-20 | Stop reason: HOSPADM

## 2022-12-20 RX ADMIN — SODIUM CHLORIDE 1000 ML: 9 INJECTION, SOLUTION INTRAVENOUS at 08:41

## 2022-12-20 NOTE — DISCHARGE INSTRUCTIONS
Moustapha Share  [id number]  1960    COLON DISCHARGE INSTRUCTIONS    Discomfort:  Redness at IV site- apply warm compress to area; if redness or soreness persist- contact your physician  There may be a slight amount of blood passed from the rectum  Gaseous discomfort- walking, belching will help relieve any discomfort  You may not operate a vehicle til the next day. You may not engage in an occupation involving machinery or appliances til the next day. You may not drink alcoholic beverages til the next day. DIET:   High fiber diet. ACTIVITY:  You may not  resume your normal daily activities til the next day. it is recommended that you spend the remainder of the day resting -  avoid any strenuous activity. CALL M.D.  IF ANY SIGN OF:   Increasing pain, nausea, vomiting  Abdominal distension (swelling)  New increased bleeding (oral or rectal)  Fever (chills)  Pain in chest area  Bloody discharge from nose or mouth  Shortness of breath    You may not  take any Advil, Aspirin, Ibuprofen, Motrin, Aleve, or Goodys for 14 days, ONLY  Tylenol as needed for pain. Post procedure diagnosis:  POLYPS; REDUNDANT COLON    Follow-up Instructions: Your follow up colonoscopy will be in 5 years. We will notify you the results of your biopsy by letter within 2 weeks. Merissa Worrell MD  December 20, 2022       DISCHARGE SUMMARY from Nurse    PATIENT INSTRUCTIONS:    After general anesthesia or intravenous sedation, for 24 hours or while taking prescription Narcotics:  Limit your activities  Do not drive and operate hazardous machinery  Do not make important personal or business decisions  Do  not drink alcoholic beverages  If you have not urinated within 8 hours after discharge, please contact your surgeon on call.     Report the following to your surgeon:  Excessive pain, swelling, redness or odor of or around the surgical area  Temperature over 100.5  Nausea and vomiting lasting longer than 4 hours or if unable to take medications  Any signs of decreased circulation or nerve impairment to extremity: change in color, persistent  numbness, tingling, coldness or increase pain  Any questions    What to do at Home:  Recommended activity: Activity as tolerated and no driving for today. If you experience any of the following symptoms as above, please follow up with Dr. Dania Nichole. *  Please give a list of your current medications to your Primary Care Provider. *  Please update this list whenever your medications are discontinued, doses are      changed, or new medications (including over-the-counter products) are added. *  Please carry medication information at all times in case of emergency situations. These are general instructions for a healthy lifestyle:    No smoking/ No tobacco products/ Avoid exposure to second hand smoke  Surgeon General's Warning:  Quitting smoking now greatly reduces serious risk to your health. Obesity, smoking, and sedentary lifestyle greatly increases your risk for illness    A healthy diet, regular physical exercise & weight monitoring are important for maintaining a healthy lifestyle    You may be retaining fluid if you have a history of heart failure or if you experience any of the following symptoms:  Weight gain of 3 pounds or more overnight or 5 pounds in a week, increased swelling in our hands or feet or shortness of breath while lying flat in bed. Please call your doctor as soon as you notice any of these symptoms; do not wait until your next office visit. The discharge information has been reviewed with the patient and spouse. The patient and spouse verbalized understanding. Discharge medications reviewed with the patient and spouse and appropriate educational materials and side effects teaching were provided.   ___________________________________________________________________________________________________________________________________  Patient armband removed and shredded.

## 2022-12-20 NOTE — H&P
Assessment/Plan  # Detail Type Description    1. Assessment Personal history of colonic polyps (Z86.010). Patient Plan 7 small polyps 2 to 10 mm removed by Dr Sherin Celestin in 2017. the report is not available  he has one bm daily he denied having other GI symptoms. he has been taking Omeprazole 20 mg daily for many years with good control of GERD. weight stable. he denied having DM, CAD or CVA. explained to her the procedure of colonoscopy and the risks involved which include but not limited to reaction to sedation, bleeding, perforation, infection or missing a lesion if bowels are not well prepped or are unusually tortuous. she agreed to proceed with the procedure and answered her questions. I gave her the Suprep to clean her bowels. I advised her to take if needed extra laxatives for few days before incase she is on the constipated side to assure adequate bowel prep. He told me he doesn't like to drink the bowel prep. I gave him sample of Sutab                This 57year old male presents for Hx polyp/colon cancer. History of Present Illness  1. Hx polyp/colon cancer   Prior screening:  colonoscopy. Denies risk factors. Pertinent negatives include abdominal pain, change in bowel habits, constipation, decreased appetite, diarrhea, melena, nausea, vomiting and weight loss. Additional information: No family history of colon cancer and last colonoscopy  2017  @ [facility] Dr. Sherin Celestin Hx adenoma . 3 yrs recall. BM. 1 x daily.           Problem List  No active problems    Past Medical/Surgical History   (Detailed)  Disease/disorder Onset Date Management Date Comments     Back surgery     Laceration left hand  Surgical repair left hand X2           Family History   (Detailed)    Relationship Family Member Name  Age at Death Condition Onset Age Cause of Death   Brother    Myocardial infarction  N   Brother    Heart disease  N   Father    Myocardial infarction  N   Father    Heart disease  N Social History  (Detailed)  Tobacco use reviewed. Preferred language is [place]. Education/Employment/Occupation  Employment History Status Retired Restrictions   [employer] full-time       Marital Status/Family/Social Support  Marital status:      Tobacco use status: Current non-smoker. Smoking status: Never smoker. Tobacco Screening  Patient has never used tobacco. Patient has not used tobacco in the last 30 days. Patient has not used smokeless tobacco in the last 30 days. Smoking Status  Type Smoking Status Usage Per Day Years Used Pack Years Total Pack Years    Never smoker         Tobacco/Vaping Exposure  No passive vaping exposure. No passive smoke exposure. Alcohol  There is a history of alcohol use. consumed socially. Caffeine  The patient uses caffeine: soda.       Medications (active prior to today)  Medication Instructions Start Date Stop Date Refilled Elsewhere   Claritin 10 mg tablet take 1 tablet by oral route  every day 10/29/2014   N   fluticasone 50 mcg/actuation nasal spray,suspension INHALE 2 SPRAY BY INTRANASAL ROUTE  EVERY DAY IN [facility] NOSTRIL 08/26/2016 08/26/2016 N   omeprazole 20 mg capsule,delayed release take 1 capsule by oral route  every day before a meal 10/22/2019  10/22/2019 N   GABAPENTIN 100 MG CAPSULE TAKE ONE CAPSULE BY MOUTH THREE TIMES A DAY 05/26/2020 05/26/2020 N   tramadol 50 mg tablet take 1 tablet by ORAL route 3 times every day as needed 10/27/2020 06/30/2022  N   tadalafil 5 mg tablet take 1 tablet by oral route  every day 01/20/2022   N   methocarbamol 500 mg tablet TAKE TWO TABLETS BY MOUTH FOUR TIMES A DAY 05/02/2022 05/02/2022 N   ROSUVASTATIN TABS 10MG TAKE 1 TABLET DAILY 06/06/2022 06/30/2022 06/06/2022 N   ibuprofen 800 mg tablet take 1 tablet by oral route 3 times every day with food //   Y   rosuvastatin 5 mg tablet take 1 tablet by oral route  every day //   Y       Medication Reconciliation  Medications reconciled today.     Medication Reviewed  Adherence Medication Name Sig Desc Elsewhere Status   taking as directed Claritin 10 mg tablet take 1 tablet by oral route  every day N Verified   taking as directed fluticasone 50 mcg/actuation nasal spray,suspension INHALE 2 SPRAY BY INTRANASAL ROUTE  EVERY DAY IN EACH NOSTRIL N Verified   taking as directed omeprazole 20 mg capsule,delayed release take 1 capsule by oral route  every day before a meal N Verified   taking as directed GABAPENTIN 100 MG CAPSULE TAKE ONE CAPSULE BY MOUTH THREE TIMES A DAY N Verified   taking as directed tadalafil 5 mg tablet take 1 tablet by oral route  every day N Verified   taking as directed methocarbamol 500 mg tablet TAKE TWO TABLETS BY MOUTH FOUR TIMES A DAY N Verified     Medications (Added, Continued or Stopped today)  Start Date Medication Directions PRN Status PRN Reason Instruction Stop Date   10/29/2014 Claritin 10 mg tablet take 1 tablet by oral route  every day N      08/26/2016 fluticasone 50 mcg/actuation nasal spray,suspension INHALE 2 SPRAY BY INTRANASAL ROUTE  EVERY DAY IN EACH NOSTRIL N      05/26/2020 GABAPENTIN 100 MG CAPSULE TAKE ONE CAPSULE BY MOUTH THREE TIMES A DAY N       ibuprofen 800 mg tablet take 1 tablet by oral route 3 times every day with food N      05/02/2022 methocarbamol 500 mg tablet TAKE TWO TABLETS BY MOUTH FOUR TIMES A DAY N      10/22/2019 omeprazole 20 mg capsule,delayed release take 1 capsule by oral route  every day before a meal N       rosuvastatin 5 mg tablet take 1 tablet by oral route  every day N      06/06/2022 ROSUVASTATIN TABS 10MG TAKE 1 TABLET DAILY N   06/30/2022 01/20/2022 tadalafil 5 mg tablet take 1 tablet by oral route  every day N  GOOD RX PRICE    10/27/2020 tramadol 50 mg tablet take 1 tablet by ORAL route 3 times every day as needed N   06/30/2022     Allergies  Ingredient Reaction (Severity) Medication Name Comment   MORPHINE unable to urinate     SHELLFISH DERIVED throat swelling (severe)             Review of Systems  System Neg/Pos Details   Constitutional Negative Chills, Fever, Malaise and Weight loss. ENMT Negative Ear infections, Nasal congestion, Sinus Infection and Sore throat. Eyes Negative Double vision and Eye pain. Respiratory Negative Asthma, Chronic cough, Dyspnea, Pleuritic pain and Wheezing. Cardio Negative Chest pain, Edema and Irregular heartbeat/palpitations. GI Negative Abdominal pain, Change in bowel habits, Constipation, Decreased appetite, Diarrhea, Dysphagia, Heartburn, Hematemesis, Hematochezia, Melena, Nausea, Reflux and Vomiting.  Negative Dysuria, Hematuria, Urinary frequency, Urinary incontinence and Urinary retention. Endocrine Negative Cold intolerance, Gynecomastia, Heat intolerance and Increased thirst.   Neuro Negative Dizziness, Headache, Numbness, Tremors and Vertigo. Psych Negative Anxiety, Depression and Increased stress. Integumentary Negative Hives, Pruritus and Rash. MS Negative Back pain, Joint pain and Myalgia. Hema/Lymph Negative Easy bleeding, Easy bruising and Lymphadenopathy. Allergic/Immuno Negative Chemicals in work place, Contact allergy, Food allergies, Immunosuppression and Seasonal allergies. Reproductive Negative Penile discharge and Sexual dysfunction. Vital Signs   Height  Time ft in cm Last Measured Height Position   3:37 PM 6.0 1.40 186.44 01/20/2022 Standing     Weight/BSA/BMI  Time lb oz kg Context BMI kg/m2 BSA m2   3:37 .00  118.388 dressed with shoes 34.06      Date/Time Temp Pulse BP Cardiac Rhythm [facility]   12/20/22 0828 98.4 °F (36.9 °C) 89 141/87 Abnormal  --      Respiratory Therapy (last day)    Date/Time Resp SpO2 O2 Device O2 Flow Rate (L/min) [facility]   12/20/22 0828 14 98 % None (Room air) --      Physical  Exam  Exam Findings Details   Constitutional Normal No acute distress. Well Nourished. Well developed.    Eyes Normal General - Right: Normal, Left: Normal. Conjunctiva - Right: Normal, Left: Normal. Sclera - Right: Normal, Left: Normal. Cornea - Right: Normal, Left: Normal. Pupil - Right: Normal, Left: Normal.   Nose/Mouth/Throat Normal Lips/teeth/gums - Normal. Tongue - Normal. Buccal mucosa - Normal. Palate & uvula - Normal.   Neck Exam Normal Inspection - Normal. Palpation - Normal. Thyroid gland - Normal. Cervical lymph nodes - Normal.   Respiratory Normal Inspection - Normal. Auscultation - Normal. Percussion - Normal. Cough - Absent. Effort - Normal.   Cardiovascular Normal Heart rate - Regular rate. Heart sounds - Normal S1, Normal S2. Murmurs - None. Extremities - No edema. Abdomen * Obese. Inspection - rounded. Abdomen Normal Appliance(s) - None. Abdominal muscles - Normal. Auscultation - Normal. Percussion - Normal. Anterior palpation - Normal, No guarding, No rebound. CVA tenderness - None. Umbilicus - Normal. Abdominal reflexes - Normal. No abdominal tenderness. No hepatic enlargement. No spleen enlargement. No hernia. No ascites. No palpable mass. Bolton's sign - Normal.   Skin Normal Inspection - Normal.   Musculoskeletal Normal Hands - Left: Normal, Right: Normal.   Extremity Normal No cyanosis. No edema. Clubbing - Absent. Neurological Normal Level of consciousness - Normal. Orientation - Normal. Memory - Normal. Motor - Normal. Balance & gait - Normal. Coordination - Normal. Fine motor skills - Normal. DTRs - Normal.   Psychiatric Normal Orientation - Oriented to time, place, person & situation. Not anxious. Appropriate mood and affect. Behavior appropriate for age. Sufficient language. No memory loss.    Immunizations Entered by History  Date Immunization   11/1/2021 12:00:00 AM SARS-COV-2 (COVID-19) vaccine, mRNA, spike protein, LNP, preservative free, 100 mcg or 50 mcg dose   4/23/2021 12:00:00 AM SARS-COV-2 (COVID-19) vaccine, mRNA, spike protein, LNP, preservative free, 100 mcg or 50 mcg dose   3/25/2021 12:00:00 AM SARS-COV-2 (COVID-19) vaccine, mRNA, spike protein, LNP, preservative free, 100 mcg or 50 mcg dose       Active Patient Care Team Members  Name Contact Agency Type Support Role Relationship Active Date Inactive Date Specialty   Obie Santos   Patient provider PCP   Tejal Cotton

## 2022-12-20 NOTE — PROCEDURES
[facility]  Colonoscopy Procedure Report  _______________________________________________________  Patient: Abhi Mcgovern                                        Attending Physician: Brian Pleitez MD    Patient ID: [id number]                                    Referring Physician: Andrew Hopkins MD    Exam Date: 12/20/2022      Introduction: A  57 y.o. male patient, presents for inpatient Colonoscopy    Indications: 7 small polyps 2 to 10 mm removed by Dr Ale Medina in 9/1/ 2017. the report is not available. No Fx hx of cancer. He has one bm daily he denied having other GI symptoms. he has been taking Omeprazole 20 mg daily for many years with good control of GERD. weight stable. he denied having DM, CAD or CVA. No abdominal surgery. Consent: The benefits, risks, and alternatives to the procedure were discussed and informed consent was obtained from the patient. Preparation: EKG, pulse, pulse oximetry and blood pressure were monitored throughout the procedure. ASA Classification: Class I- . The heart is an S1-S2 and regular heart rate and rhythm. Lungs are clear to auscultation and percussion. Abdomen is soft, nondistended, and nontender. Mental Status: awake, alert, and oriented to person, place, and time    Medications:  Fentanyl 100 mcg IV before procedure. Versed 5 mg IV, Glucagon 1 mg IV throughout the procedure. Rectal Exam: Normal Rectal Exam. No Blood. Prostate is minimally enlarged. Pathology Specimens:  2    Procedure: The colonoscope was passed with difficulty through the anus under direct visualization and advanced to the cecum and 4 cm inside the terminal ileum. The patient required positioning on the back and external counter pressure to aid in the passage of the scope because of looping. The scope was withdrawn and the mucosa was carefully examined. The quality of the preparation was excellent. The views were excellent.  The patient's toleration of the procedure was excellent. The exam was done twice to the cecum. Total time is 37 minutes and withdrawal time is 30 minutes. Findings:    Rectum:   Small internal hemorrhoids. Sigmoid:   normal  Descending Colon:   Normal   Transverse Colon:   6 mm sessile flattened polyp in the mid transverse colon, hot snared  Ascending Colon:   Normal  Cecum:   13 mm sessile polyp on a fold in the proximal ascending colon, hard to reach. It is hot snared in one block after saline submucosal injection. Terminal Ileum:   Normal.       Unplanned Events: There were no unplanned events. Estimated Blood Loss: Negligible  IMPLANTS: * No implants in log *  Impressions: Prostate is minimally enlarged. Small internal hemorrhoids. The colon is slightly long and redundant. 13 mm sessile polyp on a fold in the proximal ascending colon, hard to reach. It is hot snared in one block after saline submucosal injection. 6 mm sessile flattened polyp in the mid transverse colon, hot snared. Normal Mucosa. No blood, diverticula or AVM found. Complications: None; patient tolerated the procedure well. Recommendations:  Discharge home when standard parameters are met. Resume a high fiber diet. Resume own medications. Avoid all NSAID's for 14 days  Colonoscopy recommendation in no longer than 5 years.   Take Miralax and/ or Colace 100 mg on regular basis if constipated    Procedure Codes:    Anni Ford [[id number]]    Endoscope Information:  Model Number(s)    [id number]   Assistant: None  Signed By: Manuel Hylton MD Date: 12/20/2022

## 2022-12-21 ENCOUNTER — HOSPITAL ENCOUNTER (EMERGENCY)
Age: 62
Discharge: HOME | End: 2022-12-21
Attending: FAMILY MEDICINE
Payer: COMMERCIAL

## 2022-12-21 VITALS
HEART RATE: 94 BPM | TEMPERATURE: 97.9 F | OXYGEN SATURATION: 100 % | SYSTOLIC BLOOD PRESSURE: 117 MMHG | RESPIRATION RATE: 18 BRPM | DIASTOLIC BLOOD PRESSURE: 79 MMHG

## 2022-12-21 DIAGNOSIS — K62.5 GASTROINTESTINAL HEMORRHAGE ASSOCIATED WITH ANORECTAL SOURCE: Primary | ICD-10-CM

## 2022-12-21 LAB
ABO + RH BLD: NORMAL
ALBUMIN SERPL-MCNC: 3.9 G/DL (ref 3.4–5)
ALBUMIN/GLOB SERPL: 1.4 {RATIO} (ref 0.8–1.7)
ALP SERPL-CCNC: 61 U/L (ref 45–117)
ALT SERPL-CCNC: 139 U/L (ref 16–61)
ANION GAP SERPL CALC-SCNC: 8 MMOL/L (ref 3–18)
AST SERPL-CCNC: 47 U/L (ref 10–38)
BASOPHILS # BLD: 0 K/UL (ref 0–0.1)
BASOPHILS NFR BLD: 0 % (ref 0–2)
BILIRUB SERPL-MCNC: 0.6 MG/DL (ref 0.2–1)
BLOOD GROUP ANTIBODIES SERPL: NORMAL
BUN SERPL-MCNC: 19 MG/DL (ref 7–18)
BUN/CREAT SERPL: 18 (ref 12–20)
CALCIUM SERPL-MCNC: 8.3 MG/DL (ref 8.5–10.1)
CHLORIDE SERPL-SCNC: 108 MMOL/L (ref 100–111)
CO2 SERPL-SCNC: 24 MMOL/L (ref 21–32)
CREAT SERPL-MCNC: 1.04 MG/DL (ref 0.6–1.3)
DIFFERENTIAL METHOD BLD: ABNORMAL
EOSINOPHIL # BLD: 0.2 K/UL (ref 0–0.4)
EOSINOPHIL NFR BLD: 2 % (ref 0–5)
ERYTHROCYTE [DISTWIDTH] IN BLOOD BY AUTOMATED COUNT: 12.2 % (ref 11.6–14.5)
GLOBULIN SER CALC-MCNC: 2.7 G/DL (ref 2–4)
GLUCOSE SERPL-MCNC: 161 MG/DL (ref 74–99)
HCT VFR BLD AUTO: 41.5 % (ref 36–48)
HGB BLD-MCNC: 13.5 G/DL (ref 13–16)
IMM GRANULOCYTES # BLD AUTO: 0 K/UL (ref 0–0.04)
IMM GRANULOCYTES NFR BLD AUTO: 0 % (ref 0–0.5)
LYMPHOCYTES # BLD: 2.2 K/UL (ref 0.9–3.6)
LYMPHOCYTES NFR BLD: 21 % (ref 21–52)
MCH RBC QN AUTO: 29.2 PG (ref 24–34)
MCHC RBC AUTO-ENTMCNC: 32.5 G/DL (ref 31–37)
MCV RBC AUTO: 89.8 FL (ref 78–100)
MONOCYTES # BLD: 0.8 K/UL (ref 0.05–1.2)
MONOCYTES NFR BLD: 7 % (ref 3–10)
NEUTS SEG # BLD: 7.2 K/UL (ref 1.8–8)
NEUTS SEG NFR BLD: 69 % (ref 40–73)
NRBC # BLD: 0 K/UL (ref 0–0.01)
NRBC BLD-RTO: 0 PER 100 WBC
PLATELET # BLD AUTO: 367 K/UL (ref 135–420)
PMV BLD AUTO: 8.6 FL (ref 9.2–11.8)
POTASSIUM SERPL-SCNC: 3.8 MMOL/L (ref 3.5–5.5)
PROT SERPL-MCNC: 6.6 G/DL (ref 6.4–8.2)
RBC # BLD AUTO: 4.62 M/UL (ref 4.35–5.65)
SODIUM SERPL-SCNC: 140 MMOL/L (ref 136–145)
SPECIMEN EXP DATE BLD: NORMAL
WBC # BLD AUTO: 10.4 K/UL (ref 4.6–13.2)

## 2022-12-21 PROCEDURE — 77030020268 HC MISC GENERAL SUPPLY: Performed by: INTERNAL MEDICINE

## 2022-12-21 PROCEDURE — 77030021593 HC FCPS BIOP ENDOSC BSC -A: Performed by: INTERNAL MEDICINE

## 2022-12-21 PROCEDURE — 77030040361 HC SLV COMPR DVT MDII -B: Performed by: INTERNAL MEDICINE

## 2022-12-21 PROCEDURE — 74011250637 HC RX REV CODE- 250/637: Performed by: INTERNAL MEDICINE

## 2022-12-21 PROCEDURE — 2709999900 HC NON-CHARGEABLE SUPPLY: Performed by: INTERNAL MEDICINE

## 2022-12-21 PROCEDURE — 96360 HYDRATION IV INFUSION INIT: CPT

## 2022-12-21 PROCEDURE — 76040000009: Performed by: INTERNAL MEDICINE

## 2022-12-21 PROCEDURE — 99153 MOD SED SAME PHYS/QHP EA: CPT | Performed by: INTERNAL MEDICINE

## 2022-12-21 PROCEDURE — 88305 TISSUE EXAM BY PATHOLOGIST: CPT

## 2022-12-21 PROCEDURE — 74011250636 HC RX REV CODE- 250/636: Performed by: INTERNAL MEDICINE

## 2022-12-21 PROCEDURE — G0500 MOD SEDAT ENDO SERVICE >5YRS: HCPCS | Performed by: INTERNAL MEDICINE

## 2022-12-21 PROCEDURE — 99284 EMERGENCY DEPT VISIT MOD MDM: CPT

## 2022-12-21 PROCEDURE — 85025 COMPLETE CBC W/AUTO DIFF WBC: CPT

## 2022-12-21 PROCEDURE — 86900 BLOOD TYPING SEROLOGIC ABO: CPT

## 2022-12-21 PROCEDURE — 80053 COMPREHEN METABOLIC PANEL: CPT

## 2022-12-21 PROCEDURE — 96361 HYDRATE IV INFUSION ADD-ON: CPT

## 2022-12-21 PROCEDURE — 74011250636 HC RX REV CODE- 250/636: Performed by: FAMILY MEDICINE

## 2022-12-21 PROCEDURE — 77030013991 HC SNR POLYP ENDOSC BSC -A: Performed by: INTERNAL MEDICINE

## 2022-12-21 RX ORDER — POLYETHYLENE GLYCOL 3350 17 G/17G
17 POWDER, FOR SOLUTION ORAL 2 TIMES DAILY
Status: DISCONTINUED | OUTPATIENT
Start: 2022-12-21 | End: 2022-12-21 | Stop reason: HOSPADM

## 2022-12-21 RX ORDER — MIDAZOLAM HYDROCHLORIDE 1 MG/ML
INJECTION, SOLUTION INTRAMUSCULAR; INTRAVENOUS AS NEEDED
Status: DISCONTINUED | OUTPATIENT
Start: 2022-12-21 | End: 2022-12-21 | Stop reason: HOSPADM

## 2022-12-21 RX ORDER — FENTANYL CITRATE 50 UG/ML
INJECTION, SOLUTION INTRAMUSCULAR; INTRAVENOUS AS NEEDED
Status: DISCONTINUED | OUTPATIENT
Start: 2022-12-21 | End: 2022-12-21 | Stop reason: HOSPADM

## 2022-12-21 RX ORDER — SODIUM CHLORIDE 9 MG/ML
INJECTION, SOLUTION INTRAVENOUS
Status: DISCONTINUED | OUTPATIENT
Start: 2022-12-21 | End: 2022-12-21 | Stop reason: HOSPADM

## 2022-12-21 RX ADMIN — SODIUM CHLORIDE 1000 ML: 9 INJECTION, SOLUTION INTRAVENOUS at 05:49

## 2022-12-21 RX ADMIN — SODIUM CHLORIDE 1000 ML: 9 INJECTION, SOLUTION INTRAVENOUS at 02:46

## 2022-12-21 RX ADMIN — POLYETHYLENE GLYCOL 3350 17 G: 17 POWDER, FOR SOLUTION ORAL at 10:14

## 2022-12-21 NOTE — ED PROVIDER NOTES
EMERGENCY DEPARTMENT HISTORY AND PHYSICAL EXAM          Date: 12/21/2022  Patient Name: Carl Sharpe    History of Presenting Illness     No chief complaint on file. History Provided By: Patient    HPI: Carl Sharpe is a 57 y.o. male, pmhx listed below, who was brought to the emergency department by EMS because of rectal bleeding that started around 6 pm last night. He reports that he had 8 bloody stools from that time until 1 am when he called EMS. When he arrived to the ED he had a systolic BP of 94. He had minimal abdominal pain, mostly crampy just before having bloody stools. Yesterday, he had a colonoscopy performed by Dr. Beckie Mccartney. At that time, 2 polyps were removed from his colon. Allergies: shellfish    There are no other complaints, changes, or physical findings at this time. Current Outpatient Medications   Medication Sig Dispense Refill    rosuvastatin (CRESTOR) 5 mg tablet Take 5 mg by mouth daily. tadalafiL (Cialis) 5 mg tablet Take 5 mg by mouth daily as needed for Erectile Dysfunction. saw palmetto 500 mg cap Take  by mouth. methocarbamoL (ROBAXIN) 500 mg tablet Take 1 Tab by mouth three (3) times daily. 60 Tab 0    loratadine (CLARITIN) 10 mg tablet Take 10 mg by mouth daily. fluticasone propionate (FLONASE) 50 mcg/actuation nasal spray 2 Sprays by Both Nostrils route as needed. Omeprazole delayed release (PRILOSEC D/R) 20 mg tablet Take 20 mg by mouth daily. gabapentin (NEURONTIN) 100 mg capsule Take 100 mg by mouth three (3) times daily. cholecalciferol, vitamin D3, 50 mcg (2,000 unit) tab Take  by mouth.      multivitamin (ONE A DAY) tablet Take 1 Tab by mouth daily.          Past History     Past Medical History:  Past Medical History:   Diagnosis Date    Arthritis     osteo    GERD (gastroesophageal reflux disease)        Past Surgical History:  Past Surgical History:   Procedure Laterality Date    HX BACK SURGERY      cervical neck 2019 with plate and C1-H6 disc replacement surgery    HX COLONOSCOPY      HX ORTHOPAEDIC Left 2014    left hand reattachment    IR INJ EPIDURAL CERV/THOR STEROID         Family History:  History reviewed. No pertinent family history. Social History:  Social History     Tobacco Use    Smoking status: Never    Smokeless tobacco: Never   Substance Use Topics    Alcohol use: Yes     Alcohol/week: 18.0 standard drinks     Types: 18 Cans of beer per week    Drug use: Not Currently       Allergies: Allergies   Allergen Reactions    Shellfish Derived Anaphylaxis     Patient stated that topical iodine does not cause any reactions. Review of Systems   Review of Systems   Constitutional:  Negative for appetite change and fever. HENT:  Negative for congestion and sore throat. Respiratory:  Negative for cough and shortness of breath. Cardiovascular:  Negative for leg swelling. Gastrointestinal:  Positive for abdominal pain and anal bleeding. Negative for diarrhea and nausea. Genitourinary:  Negative for dysuria. Musculoskeletal:  Negative for back pain and neck pain. Skin:  Negative for rash. Neurological:  Positive for light-headedness. Negative for headaches. Hematological:  Does not bruise/bleed easily. Physical Exam   Physical Exam  Vitals reviewed. Constitutional:       General: He is not in acute distress. Appearance: He is well-developed. He is not diaphoretic. HENT:      Head: Normocephalic and atraumatic. Right Ear: External ear normal.      Left Ear: External ear normal.      Nose: Nose normal.      Mouth/Throat:      Mouth: Mucous membranes are moist.      Pharynx: No oropharyngeal exudate. Eyes:      General: No scleral icterus. Right eye: No discharge. Left eye: No discharge. Conjunctiva/sclera: Conjunctivae normal.      Pupils: Pupils are equal, round, and reactive to light. Neck:      Thyroid: No thyromegaly. Vascular: No JVD. Trachea: No tracheal deviation. Cardiovascular:      Rate and Rhythm: Normal rate and regular rhythm. Heart sounds: Normal heart sounds. No murmur heard. No friction rub. No gallop. Pulmonary:      Effort: Pulmonary effort is normal. No respiratory distress. Breath sounds: No wheezing or rales. Abdominal:      General: Bowel sounds are normal. There is no distension. Palpations: Abdomen is soft. Tenderness: There is no abdominal tenderness. There is no rebound. Genitourinary:     Comments: Deferred  Musculoskeletal:         General: No tenderness or deformity. Normal range of motion. Cervical back: Normal range of motion and neck supple. Skin:     General: Skin is warm and dry. Neurological:      General: No focal deficit present. Mental Status: He is alert and oriented to person, place, and time. Cranial Nerves: No cranial nerve deficit. Coordination: Coordination normal.      Deep Tendon Reflexes: Reflexes are normal and symmetric. Psychiatric:         Behavior: Behavior normal.       Diagnostic Study Results     Labs -     Recent Results (from the past 12 hour(s))   CBC WITH AUTOMATED DIFF    Collection Time: 12/21/22  2:43 AM   Result Value Ref Range    WBC 10.4 4.6 - 13.2 K/uL    RBC 4.62 4.35 - 5.65 M/uL    HGB 13.5 13.0 - 16.0 g/dL    HCT 41.5 36.0 - 48.0 %    MCV 89.8 78.0 - 100.0 FL    MCH 29.2 24.0 - 34.0 PG    MCHC 32.5 31.0 - 37.0 g/dL    RDW 12.2 11.6 - 14.5 %    PLATELET 437 933 - 210 K/uL    MPV 8.6 (L) 9.2 - 11.8 FL    NRBC 0.0 0  WBC    ABSOLUTE NRBC 0.00 0.00 - 0.01 K/uL    NEUTROPHILS 69 40 - 73 %    LYMPHOCYTES 21 21 - 52 %    MONOCYTES 7 3 - 10 %    EOSINOPHILS 2 0 - 5 %    BASOPHILS 0 0 - 2 %    IMMATURE GRANULOCYTES 0 0.0 - 0.5 %    ABS. NEUTROPHILS 7.2 1.8 - 8.0 K/UL    ABS. LYMPHOCYTES 2.2 0.9 - 3.6 K/UL    ABS. MONOCYTES 0.8 0.05 - 1.2 K/UL    ABS. EOSINOPHILS 0.2 0.0 - 0.4 K/UL    ABS. BASOPHILS 0.0 0.0 - 0.1 K/UL    ABS. IMM. GRANS. 0.0 0.00 - 0.04 K/UL    DF AUTOMATED     METABOLIC PANEL, COMPREHENSIVE    Collection Time: 12/21/22  2:43 AM   Result Value Ref Range    Sodium 140 136 - 145 mmol/L    Potassium 3.8 3.5 - 5.5 mmol/L    Chloride 108 100 - 111 mmol/L    CO2 24 21 - 32 mmol/L    Anion gap 8 3.0 - 18 mmol/L    Glucose 161 (H) 74 - 99 mg/dL    BUN 19 (H) 7.0 - 18 MG/DL    Creatinine 1.04 0.6 - 1.3 MG/DL    BUN/Creatinine ratio 18 12 - 20      eGFR >60 >60 ml/min/1.73m2    Calcium 8.3 (L) 8.5 - 10.1 MG/DL    Bilirubin, total 0.6 0.2 - 1.0 MG/DL    ALT (SGPT) 139 (H) 16 - 61 U/L    AST (SGOT) 47 (H) 10 - 38 U/L    Alk. phosphatase 61 45 - 117 U/L    Protein, total 6.6 6.4 - 8.2 g/dL    Albumin 3.9 3.4 - 5.0 g/dL    Globulin 2.7 2.0 - 4.0 g/dL    A-G Ratio 1.4 0.8 - 1.7     TYPE & SCREEN    Collection Time: 12/21/22  2:43 AM   Result Value Ref Range    Crossmatch Expiration 12/24/2022,2359     ABO/Rh(D) A POSITIVE     Antibody screen NEG            Medical Decision Making   I am the first provider for this patient. I reviewed the vital signs, available nursing notes, past medical history, past surgical history, family history and social history. Vital Signs-Reviewed the patient's vital signs.   Patient Vitals for the past 12 hrs:   Temp Pulse Resp BP SpO2   12/21/22 0740 -- (!) 108 (!) 0 -- 100 %   12/21/22 0734 97.9 °F (36.6 °C) -- -- -- --   12/21/22 0727 -- (!) 110 -- 120/66 100 %   12/21/22 0722 -- (!) 117 -- 92/67 100 %   12/21/22 0721 -- (!) 106 -- (!) 85/57 100 %   12/21/22 0701 -- (!) 106 15 122/82 98 %   12/21/22 0650 -- -- 16 115/80 99 %   12/21/22 0630 -- (!) 105 17 104/73 96 %   12/21/22 0600 -- 100 15 115/71 98 %   12/21/22 0500 -- 99 13 104/79 100 %   12/21/22 0430 -- 93 -- 124/75 99 %   12/21/22 0400 -- 88 -- 121/83 100 %   12/21/22 0330 -- 82 -- 127/83 97 %       Pulse Oximetry Analysis - 100% on RA    Cardiac Monitor:   Rate: 108 bpm  Rhythm: Sinus Tachycardia      Records Reviewed: Nursing Notes    Provider Notes (Medical Decision Making):   MDM: 26-year-old 1 day status post colonoscopy yesterday with hemorrhaging tonight. Will give IV fluids for now and send a type and screen. Will consult GI, Dr. Paresh Sinha. ED Course:   Initial assessment performed. The patients presenting problems have been discussed, and they are in agreement with the care plan formulated and outlined with them. I have encouraged them to ask questions as they arise throughout their visit. PROGRESS NOTE:    ED Course as of 12/24/22 0626   Wed Dec 21, 2022   [id number] Case discussed with Dr. Paresh Sinha, who plans to take this patient to the OR in the morning. [VG]   0800 Pt signed out to Dr. Cuate Casillas [VG]   [phone] Discussed with Dr. Mike Peters. Recommends discharge home when standard parameters are met. [MAME]      ED Course User Index  [MAME] Jn Randolph DO  [VG] Dominic Camargo MD        Discharge note:  Pt re-evaluated and noted to be feeling better, ready for discharge. Updated pt  on all final lab findings. Will follow up as instructed . All questions have been answered, pt voiced understanding and agreement with plan. Specific return precautions provided as well as instructions to return to the ED should sx worsen at any time. Vital signs stable for discharge. Critical Care Time: 0        Diagnosis     Clinical Impression:   1. Gastrointestinal hemorrhage associated with anorectal source        PLAN:     Discharge home    Please note, this dictation was completed with [employer], the computer voice recognition software. Quite often unanticipated grammatical, syntax, homophones, and other interpretive errors are inadvertently transcribed by the computer software. Please disregard these errors. Please excuse any errors that have escaped final proof reading.

## 2022-12-21 NOTE — ED NOTES
ED Course as of 12/22/22 1919   Wed Dec 21, 2022   [id number] Case discussed with Dr. Beckie Mccartney, who plans to take this patient to the OR in the morning. [VG]   0800 Pt signed out to Dr. Galo Carson [VG]   [phone] Discussed with Dr. Maricruz Tsang. Recommends discharge home when standard parameters are met.  [MAME]      ED Course User Index  [MAME] Manolo Kirk DO  [VG] MD Larry Johnson DO

## 2022-12-21 NOTE — Clinical Note
[facility] [place]  [facility] EMERGENCY DEPT  [address] [address] [phone]    Work/School Note    Date: 12/21/2022    To Whom It May concern:    Greg Nogueira was seen and treated today in the emergency room by the following provider(s):  Attending Provider: Andrés Huang DO. Greg Nogueira is excused from work/school on 12/21/22 and 12/22/22. He is medically clear to return to work/school on 12/23/2022.        Sincerely,          Morenita Kumar DO

## 2022-12-21 NOTE — DISCHARGE INSTRUCTIONS
Resume your home medications. You should avoid NSAIDs for the next 21 days. NSAIDs include medications such as aspirin, naproxen, ibuprofen, ketorolac, etodolac or diclofenac. Follow-up with your gastroenterologist.    Return to the ED if you have any worsening or changes in the meantime.

## 2022-12-21 NOTE — PROCEDURES
[facility]  Colonoscopy Procedure Report  _______________________________________________________  Patient: Manuel Clemons                                        Attending Physician: Lina Pompa MD    Patient ID: [id number]                                    Referring Physician: Amrita Chatman MD    Exam Date: 12/21/2022      Introduction: A  57 y.o. male patient, presents for inpatient Colonoscopy in the ER    Indications: had yesterday morning colonoscopy with multiple polyps removed including 14 mm sessile polyp in a difficult location in the ascending colon. In the after noon he passed on 10 occasions fresh blood. Initially it was in small quantity but close to mid night it was getting heavier and became weak and hypotensive. Was brought by EMS at 3 pm . His Hgb was 13.5 and  vital signs were normal but at 7:21 am the blood pressure dropped to 85/57. Heart rate went to 110. He was having some lower abdominal cramps but were relieved by bowel evacuation. No N/V or syncopal episode. Consent: The benefits, risks, and alternatives to the procedure were discussed and informed consent was obtained from the patient. Preparation: EKG, pulse, pulse oximetry and blood pressure were monitored throughout the procedure. ASA Classification: Class II- . The heart is an S1-S2 and regular heart rate and rhythm. Lungs are clear to auscultation and percussion. Abdomen is soft, nondistended, and nontender. Mental Status: awake, alert, and oriented to person, place, and time    Medications:  Fentanyl 100 mcg IV before procedure. Versed 5 mg IV, Glucagon 1 mg IV throughout the procedure. Rectal Exam: Normal Rectal Exam. No Blood. Prostate not enlarged    Pathology Specimens:  1    Procedure: The colonoscope was passed with difficulty through the anus under direct visualization and advanced to the cecum.  The patient required positioning on the back and external counter pressure to aid in the passage of the scope because of looping. The scope was withdrawn and the mucosa was carefully examined. The quality of the preparation was poor. The views were poor. The patient's toleration of the procedure was excellent. The exam was done twice to the cecum. Total time is 61 minutes and withdrawal time is 19 minutes. Findings:    Rectum:   Small internal hemorrhoids. moderate amount of old blood and some clots  Sigmoid:   Long and redundant sigmoid. moderate amount of old blood and some clots  Descending Colon:   moderate amount of old blood and some clots  Transverse Colon:   Small amount of old blood and some clots. 6 mm sessile flattened polyp in the proximal transverse colon cold snared but not recovered. Ascending Colon:   moderate amount of old blood and some clots. <7 mm ulcer from the previous polypectomy site, covered with a small fresh clot oozing on the side intermittently some fresh blood. The location was difficult to reach because of looping and angulation. I had to apply a total of 5 metallic clips to fully control the bleeding. 3 mm  sessile polyp in the proximal ascending colon, hiding behind a fold, cold snared. One diverticulum found in the proximal ascending colon. Cecum:   5 mm flat cecal polyp found, cold snared. Small amount of old blood and some clots  Terminal Ileum:   Not entered       Unplanned Events: There were no unplanned events. Estimated Blood Loss: Hard to estimate 500 cc present in the colon  IMPLANTS: * No implants in log *  Impressions: Small internal hemorrhoids. Long and redundant colon containing moderate amount of old blood and some clots. More in the ascending colon. At the proximal ascending colon, there is a small <7 mm ulcer from the previous polypectomy site, covered with a small fresh clot oozing on the side intermittently some fresh blood. The location was difficult to reach because of looping and angulation.  I had to apply a total of 5 metallic clips to fully control the bleeding. 5 mm flat cecal polyp found, cold snared. 3 mm  sessile polyp in the proximal ascending colon, hiding behind a fold, cold snared and 6 mm sessile flattened polyp in the proximal transverse colon cold snared but not recovered. One diverticulum found in the proximal ascending colon. Complications: None; patient tolerated the procedure well. Recommendations: He would be able to be Discharge home when standard parameters are met. Resume clear liquid diet today and this evening can start on full liquid diet x 2 days before increasing diet to solid. Resume own medications. Avoid all NSAID's for 21 days  Colonoscopy recommendation in < 5 years.   Take Miralax and/ or Colace 100 mg on regular basis if constipated    Procedure Codes:    Celina Zhou [[id number]]  Gerardo Barrios [[id number]]    Endoscope Information:  Model Number(s)    [id number]   Assistant: None  Signed By: Jeanine Marcelo MD Date: 12/21/2022

## 2022-12-21 NOTE — ED TRIAGE NOTES
Patient arrived to the ED via medic from home, with complaints of 8 bloody stools post colonoscopy that ws performed yesterday. Patient states the stools have gotten progressively worse. Medic states only 2 polyps were found during the colonoscopy. Patient alert and oriented x4. NAD. Will continue to monitor.

## 2022-12-21 NOTE — PROGRESS NOTES
Consulted for admission. He is not requiring a blood transfusion and Dr. García Speaker plans to take to endoscopy suite this morning from ED. Will hold on admission for now pending procedure.

## 2022-12-21 NOTE — PERIOP NOTES
Report given to [facility] RN Fentanyl 100 mcg and Versed 5 mg given during the procedure. VSS.  O2 Sats 100% on 2 lpm

## 2022-12-21 NOTE — ED NOTES
Pt ambulatory, no complaints voiced. Vitals stable and pt in NAD. I have reviewed discharge instructions with the patient and spouse. The patient and spouse verbalized understanding.

## 2023-03-02 ENCOUNTER — HOSPITAL ENCOUNTER (OUTPATIENT)
Facility: HOSPITAL | Age: 63
Discharge: HOME | End: 2023-03-05

## 2023-03-02 DIAGNOSIS — M17.12 PRIMARY OSTEOARTHRITIS OF LEFT KNEE: ICD-10-CM

## 2023-03-04 LAB
BACTERIA SPEC CULT: NORMAL
BACTERIA SPEC CULT: NORMAL
SERVICE CMNT-IMP: NORMAL

## 2023-03-13 PROBLEM — M17.12 PRIMARY OSTEOARTHRITIS OF LEFT KNEE: Chronic | Status: ACTIVE | Noted: 2023-03-13

## 2023-03-13 NOTE — H&P
History and Physical        Patient: Karlo Pack               Sex: male          DOA: (Not on file)         YOB: 1960      Age:  57 y.o.        LOS:  LOS: 0 days        HPI:     Suraj Law is in for followup of his bilateral severe medial tibiofemoral DJD. The patient notes he had some improvement with the Orthovisc he has tried but it has not been enough to decrease his need for knee replacement. He is still interested in progressing with TKA. He has a trip in January and then he is going to [place] in February so he wants to consider mid-March. He is just about due to have Orthovisc now. Standing AP, tunnel, lateral, and sunrise views of the  left  knee were obtained and interpreted in the office and show severe medial tibiofemoral DJD, otherwise, x-rays reveal no periosteal reaction, no medullary lesions, no osteopenia and no chondrolysis. Past Medical History:   Diagnosis Date    Arthritis     osteo    GERD (gastroesophageal reflux disease)     Hypercholesteremia        Past Surgical History:   Procedure Laterality Date    BACK SURGERY      cervical neck 2019 with plate and Y0-Y5 disc replacement surgery    COLONOSCOPY N/A 12/21/2022    COLONOSCOPY; CLIP PLACEMENT X 5  ASCENDING COLON; POLYPECTOMY performed by Sheryle Quin, MD at [facility] ENDOSCOPY    COLONOSCOPY N/A 12/20/2022    COLONOSCOPY; SCLERO INJECTION WITH SALINE; POLYPECTOMY performed by Sheryle Quin, MD at [facility] ENDOSCOPY    COLONOSCOPY      IR INJ EPIDURAL CERV/THOR STEROID WO IMG      ORTHOPEDIC SURGERY Left 2014    left hand reattachment    TONSILLECTOMY      VASECTOMY         No family history on file. Social History     Socioeconomic History    Marital status:    Tobacco Use    Smoking status: Never    Smokeless tobacco: Never   Vaping Use    Vaping Use: Never used   Substance and Sexual Activity    Alcohol use:  Yes     Alcohol/week: 12.0 standard drinks     Types: 12 Cans of beer per week Drug use: Not Currently       Prior to Admission medications    Medication Sig Start Date End Date Taking? Authorizing Provider   rosuvastatin (CRESTOR) 5 MG tablet Take 5 mg by mouth daily 12/12/22   Historical Provider, MD   omeprazole (PRILOSEC OTC) 20 MG tablet Take 20 mg by mouth daily    Historical Provider, MD   loratadine (CLARITIN) 10 MG tablet Take 10 mg by mouth daily    Historical Provider, MD   Saw Taylor 500 MG CAPS Take 1,000 mg by mouth daily    Historical Provider, MD   Cholecalciferol 50 MCG (2000 UT) TABS Take by mouth daily    Historical Provider, MD   Multiple Vitamin (MULTIVITAMIN ADULT PO) Take 1 tablet by mouth daily    Historical Provider, MD   vitamin C (ASCORBIC ACID) 500 MG tablet Take 500 mg by mouth daily    Historical Provider, MD   FERROUS SULFATE PO Take 28 mg by mouth daily    Historical Provider, MD   methocarbamol (ROBAXIN) 500 MG tablet Take 500 mg by mouth 4 times daily as needed 6/23/20   Historical Provider, MD   tadalafil (CIALIS) 5 MG tablet Take 5 mg by mouth daily as needed    Historical Provider, MD   ibuprofen (ADVIL;MOTRIN) 200 MG tablet Take 200 mg by mouth every 6 hours as needed    Historical Provider, MD   fluticasone (FLONASE) 50 MCG/ACT nasal spray 2 sprays by Nasal route as needed    Historical Provider, MD   amoxicillin (AMOXIL) 500 MG capsule Take 500 mg by mouth once Indications: prior to dental procedures    Historical Provider, MD       Allergies   Allergen Reactions    Shellfish Allergy Anaphylaxis     Patient stated that topical iodine does not cause any reactions. Morphine Other (See Comments)     Unable to void       Review of Systems  GENERAL:  Patient has no signs of fever, chills or weight change. HEAD/ENTM:  Patient has no signs of headaches, dizziness, hearing loss, ringing in ears, sore throat/hoarseness, recent cold, double vision, blurred vision, itchy eyes, eye redness or eye discharge.   CARDIOVASCULAR:  Patient has no signs of chest pain, palpitations, rheumatic fever or heart murmur. RESPIRATORY:  Patient has no signs of chronic cough, wheezing, difficulty breathing, pain on breathing or shortness of breath. GASTROINTESTINAL:  Patient has no signs of nausea/vomiting, difficulty swallowing, gas/bloating, indigestion, abdominal pain, diarrhea, bloody stools or hemorrhoids. GENITOURINARY:  Patient has no signs of blood in urine, painful urinating, burning sensation, bladder/kidney infection, frequent urinating or incontinence. MUSCULOSKELETAL: Patient presents with joint stiffness and joint pain. Patient has no signs of fracture/dislocation, sprain/strain, tendonitis, rheumatoid disease, gout or swelling of feet. INTEGUMENTARY:  Patient has no signs of rash/itching, psoriasis, Raynaud's phenomenon or varicose veins. EMOTIONAL:  Patient has no signs of anxiety, depression, bipolar disorder, memory loss or change in mood. Physical Exam:      There were no vitals taken for this visit. Physical Exam:   Physical exam shows a healthy-appearing 57 year-old white male. The left knee has about -5 degrees of extension to flexion beyond 90 degrees. He has pain on the medial joint line with positive patellofemoral crepitation. The patient has a negative Lachman and has no varus or valgus instability at zero degrees or 30 degrees. There is a negative posterior sag. There is no knee effusion. There is no swelling, ecchymosis, or wounds. The patient has no lateral joint line pain and no popliteal pain. The patient has a negative patellar inhibition, a negative patellar grind, and a negative patellar apprehension test.  There is a negative Camilo Maneuver. There is no pain at the inferior pole of the patella or the tibial tubercle. The patient has 5/5 muscle strength with good quadriceps tone.   The patient has good capillary refill with normal motor strength of the foot and ankle and normal light-touch sensation in the foot and lower leg.    Assessment/Plan     Principal Problem:    Primary osteoarthritis of left knee  Resolved Problems:    * No resolved hospital problems. *      The risks associated with left outpatient total knee arthroplasty using the subvastus approach were reviewed and all questions were answered. The benefits include earlier ambulation, better mobility, and quicker return of muscle function. The risks including pain, bleeding, infection, DVT, need for future surgeries amongst others are reviewed and questions are answered. The patient is going to be scheduled for outpatient TKA using the [employer] knee replacement system. We have given the patient Keflex for antibiotic prophylaxis and also a prescription for Roxicodone for postoperative pain management. We will use aspirin 81mg twice daily for DVT prophylaxis. The patient will also receive an IV dose of antibiotics preoperatively. The patient will be discharged the same day to home health physical therapy. The patient was counseled on the importance of ice, elevation, muscle stretching and range of motion exercises. The patient will follow up two weeks postoperatively. Review of X-Rays show Kellgren-Ed grade 3/4 changes. We are going to do this at [facility] in mid-March of 2023. In the interim we will get approval for Orthovisc and hopefully begin that as soon as possible in order to get his knee feeling better for his upcoming trips. Once his left knee is better enough, we discussed progressing to the right.

## 2023-03-14 ENCOUNTER — ANESTHESIA EVENT (OUTPATIENT)
Facility: HOSPITAL | Age: 63
End: 2023-03-14
Payer: COMMERCIAL

## 2023-03-15 ENCOUNTER — HOME HEALTH ADMISSION (OUTPATIENT)
Age: 63
End: 2023-03-15
Payer: COMMERCIAL

## 2023-03-15 ENCOUNTER — HOSPITAL ENCOUNTER (OUTPATIENT)
Facility: HOSPITAL | Age: 63
Discharge: HOME | End: 2023-03-15
Attending: ORTHOPAEDIC SURGERY | Admitting: ORTHOPAEDIC SURGERY
Payer: COMMERCIAL

## 2023-03-15 ENCOUNTER — ANESTHESIA (OUTPATIENT)
Facility: HOSPITAL | Age: 63
End: 2023-03-15
Payer: COMMERCIAL

## 2023-03-15 VITALS
RESPIRATION RATE: 20 BRPM | OXYGEN SATURATION: 98 % | DIASTOLIC BLOOD PRESSURE: 71 MMHG | SYSTOLIC BLOOD PRESSURE: 145 MMHG | HEART RATE: 71 BPM | TEMPERATURE: 97.5 F

## 2023-03-15 PROBLEM — M17.12 OSTEOARTHRITIS OF LEFT KNEE, UNSPECIFIED OSTEOARTHRITIS TYPE: Status: RESOLVED | Noted: 2023-03-15 | Resolved: 2023-03-15

## 2023-03-15 PROBLEM — M17.12 OSTEOARTHRITIS OF LEFT KNEE, UNSPECIFIED OSTEOARTHRITIS TYPE: Status: ACTIVE | Noted: 2023-03-15

## 2023-03-15 PROBLEM — M17.12 PRIMARY OSTEOARTHRITIS OF LEFT KNEE: Chronic | Status: RESOLVED | Noted: 2023-03-13 | Resolved: 2023-03-15

## 2023-03-15 LAB
ABO + RH BLD: NORMAL
BLOOD GROUP ANTIBODIES SERPL: NORMAL
SPECIMEN EXP DATE BLD: NORMAL

## 2023-03-15 PROCEDURE — 3600000012 HC SURGERY LEVEL 2 ADDTL 15MIN: Performed by: ORTHOPAEDIC SURGERY

## 2023-03-15 PROCEDURE — 3700000001 HC ADD 15 MINUTES (ANESTHESIA): Performed by: ORTHOPAEDIC SURGERY

## 2023-03-15 PROCEDURE — C1776 JOINT DEVICE (IMPLANTABLE): HCPCS | Performed by: ORTHOPAEDIC SURGERY

## 2023-03-15 PROCEDURE — L1830 KO IMMOB CANVAS LONG PRE OTS: HCPCS | Performed by: ORTHOPAEDIC SURGERY

## 2023-03-15 PROCEDURE — 2580000003 HC RX 258: Performed by: ORTHOPAEDIC SURGERY

## 2023-03-15 PROCEDURE — 2500000003 HC RX 250 WO HCPCS: Performed by: ORTHOPAEDIC SURGERY

## 2023-03-15 PROCEDURE — 86901 BLOOD TYPING SEROLOGIC RH(D): CPT

## 2023-03-15 PROCEDURE — 7100000010 HC PHASE II RECOVERY - FIRST 15 MIN: Performed by: ORTHOPAEDIC SURGERY

## 2023-03-15 PROCEDURE — 3700000000 HC ANESTHESIA ATTENDED CARE: Performed by: ORTHOPAEDIC SURGERY

## 2023-03-15 PROCEDURE — A4217 STERILE WATER/SALINE, 500 ML: HCPCS | Performed by: ORTHOPAEDIC SURGERY

## 2023-03-15 PROCEDURE — 7100000001 HC PACU RECOVERY - ADDTL 15 MIN: Performed by: ORTHOPAEDIC SURGERY

## 2023-03-15 PROCEDURE — 2709999900 HC NON-CHARGEABLE SUPPLY: Performed by: ORTHOPAEDIC SURGERY

## 2023-03-15 PROCEDURE — 7100000011 HC PHASE II RECOVERY - ADDTL 15 MIN: Performed by: ORTHOPAEDIC SURGERY

## 2023-03-15 PROCEDURE — 6360000002 HC RX W HCPCS: Performed by: PHYSICIAN ASSISTANT

## 2023-03-15 PROCEDURE — 6370000000 HC RX 637 (ALT 250 FOR IP): Performed by: SPECIALIST

## 2023-03-15 PROCEDURE — 6360000002 HC RX W HCPCS: Performed by: SPECIALIST

## 2023-03-15 PROCEDURE — C1713 ANCHOR/SCREW BN/BN,TIS/BN: HCPCS | Performed by: ORTHOPAEDIC SURGERY

## 2023-03-15 PROCEDURE — 97165 OT EVAL LOW COMPLEX 30 MIN: CPT

## 2023-03-15 PROCEDURE — 6370000000 HC RX 637 (ALT 250 FOR IP): Performed by: PHYSICIAN ASSISTANT

## 2023-03-15 PROCEDURE — 2500000003 HC RX 250 WO HCPCS: Performed by: SPECIALIST

## 2023-03-15 PROCEDURE — 2720000010 HC SURG SUPPLY STERILE: Performed by: ORTHOPAEDIC SURGERY

## 2023-03-15 PROCEDURE — 64447 NJX AA&/STRD FEMORAL NRV IMG: CPT | Performed by: SPECIALIST

## 2023-03-15 PROCEDURE — 97161 PT EVAL LOW COMPLEX 20 MIN: CPT

## 2023-03-15 PROCEDURE — 3600000002 HC SURGERY LEVEL 2 BASE: Performed by: ORTHOPAEDIC SURGERY

## 2023-03-15 PROCEDURE — 7100000000 HC PACU RECOVERY - FIRST 15 MIN: Performed by: ORTHOPAEDIC SURGERY

## 2023-03-15 PROCEDURE — 6360000002 HC RX W HCPCS: Performed by: NURSE ANESTHETIST, CERTIFIED REGISTERED

## 2023-03-15 PROCEDURE — 97535 SELF CARE MNGMENT TRAINING: CPT

## 2023-03-15 PROCEDURE — 97116 GAIT TRAINING THERAPY: CPT

## 2023-03-15 PROCEDURE — 2580000003 HC RX 258: Performed by: PHYSICIAN ASSISTANT

## 2023-03-15 PROCEDURE — 36415 COLL VENOUS BLD VENIPUNCTURE: CPT

## 2023-03-15 PROCEDURE — 6360000002 HC RX W HCPCS: Performed by: ORTHOPAEDIC SURGERY

## 2023-03-15 DEVICE — KNEE K1 TOT HEMI STD CEM IMPL CAPPED SYNTHES: Type: IMPLANTABLE DEVICE | Site: KNEE | Status: FUNCTIONAL

## 2023-03-15 DEVICE — ATTUNE KNEE SYSTEM TIBIAL BASE ROTATING PLATFORM SIZE 8 CEMENTED
Type: IMPLANTABLE DEVICE | Site: KNEE | Status: FUNCTIONAL
Brand: ATTUNE

## 2023-03-15 DEVICE — CEMENT BNE 40GM FULL DOSE PMMA W/O ANTIBIO HI VISC N RADPQ: Type: IMPLANTABLE DEVICE | Site: KNEE | Status: FUNCTIONAL

## 2023-03-15 DEVICE — ATTUNE PATELLA MEDIALIZED ANATOMIC 38MM CEMENTED AOX
Type: IMPLANTABLE DEVICE | Site: KNEE | Status: FUNCTIONAL
Brand: ATTUNE

## 2023-03-15 DEVICE — ATTUNE KNEE SYSTEM TIBIAL INSERT ROTATING PLATFORM CRUCIATE RETAINING SIZE 8 5MM AOX
Type: IMPLANTABLE DEVICE | Site: KNEE | Status: FUNCTIONAL
Brand: ATTUNE

## 2023-03-15 DEVICE — ATTUNE KNEE SYSTEM FEMORAL CRUCIATE RETAINING SIZE 8 LEFT CEMENTED
Type: IMPLANTABLE DEVICE | Site: KNEE | Status: FUNCTIONAL
Brand: ATTUNE

## 2023-03-15 RX ORDER — MIDAZOLAM HYDROCHLORIDE 1 MG/ML
INJECTION INTRAMUSCULAR; INTRAVENOUS
Status: COMPLETED
Start: 2023-03-15 | End: 2023-03-15

## 2023-03-15 RX ORDER — DIPHENHYDRAMINE HYDROCHLORIDE 50 MG/ML
12.5 INJECTION INTRAMUSCULAR; INTRAVENOUS
Status: DISCONTINUED | OUTPATIENT
Start: 2023-03-15 | End: 2023-03-15 | Stop reason: HOSPADM

## 2023-03-15 RX ORDER — SODIUM CHLORIDE 0.9 % (FLUSH) 0.9 %
5-40 SYRINGE (ML) INJECTION PRN
Status: DISCONTINUED | OUTPATIENT
Start: 2023-03-15 | End: 2023-03-15 | Stop reason: HOSPADM

## 2023-03-15 RX ORDER — CELECOXIB 100 MG/1
200 CAPSULE ORAL ONCE
Status: COMPLETED | OUTPATIENT
Start: 2023-03-15 | End: 2023-03-15

## 2023-03-15 RX ORDER — OXYCODONE HYDROCHLORIDE 5 MG/1
5 TABLET ORAL EVERY 4 HOURS PRN
Status: DISCONTINUED | OUTPATIENT
Start: 2023-03-15 | End: 2023-03-15 | Stop reason: HOSPADM

## 2023-03-15 RX ORDER — CEPHALEXIN 500 MG/1
1000 CAPSULE ORAL SEE ADMIN INSTRUCTIONS
COMMUNITY

## 2023-03-15 RX ORDER — TRANEXAMIC ACID 650 MG/1
1950 TABLET ORAL
Status: DISCONTINUED | OUTPATIENT
Start: 2023-03-15 | End: 2023-03-15 | Stop reason: HOSPADM

## 2023-03-15 RX ORDER — SODIUM CHLORIDE, SODIUM LACTATE, POTASSIUM CHLORIDE, CALCIUM CHLORIDE 600; 310; 30; 20 MG/100ML; MG/100ML; MG/100ML; MG/100ML
1000 INJECTION, SOLUTION INTRAVENOUS CONTINUOUS
Status: DISCONTINUED | OUTPATIENT
Start: 2023-03-15 | End: 2023-03-15 | Stop reason: HOSPADM

## 2023-03-15 RX ORDER — SODIUM CHLORIDE, SODIUM LACTATE, POTASSIUM CHLORIDE, CALCIUM CHLORIDE 600; 310; 30; 20 MG/100ML; MG/100ML; MG/100ML; MG/100ML
INJECTION, SOLUTION INTRAVENOUS CONTINUOUS
Status: DISCONTINUED | OUTPATIENT
Start: 2023-03-15 | End: 2023-03-15 | Stop reason: HOSPADM

## 2023-03-15 RX ORDER — ONDANSETRON 2 MG/ML
4 INJECTION INTRAMUSCULAR; INTRAVENOUS
Status: DISCONTINUED | OUTPATIENT
Start: 2023-03-15 | End: 2023-03-15 | Stop reason: HOSPADM

## 2023-03-15 RX ORDER — SODIUM CHLORIDE 0.9 % (FLUSH) 0.9 %
5-40 SYRINGE (ML) INJECTION EVERY 12 HOURS SCHEDULED
Status: DISCONTINUED | OUTPATIENT
Start: 2023-03-15 | End: 2023-03-15 | Stop reason: HOSPADM

## 2023-03-15 RX ORDER — SODIUM CHLORIDE 9 MG/ML
INJECTION, SOLUTION INTRAVENOUS PRN
Status: DISCONTINUED | OUTPATIENT
Start: 2023-03-15 | End: 2023-03-15 | Stop reason: HOSPADM

## 2023-03-15 RX ORDER — ACETAMINOPHEN 500 MG
1000 TABLET ORAL ONCE
Status: CANCELLED | OUTPATIENT
Start: 2023-03-15 | End: 2023-03-15

## 2023-03-15 RX ORDER — PANTOPRAZOLE SODIUM 40 MG/1
40 TABLET, DELAYED RELEASE ORAL ONCE
Status: DISCONTINUED | OUTPATIENT
Start: 2023-03-15 | End: 2023-03-15 | Stop reason: HOSPADM

## 2023-03-15 RX ORDER — PROPOFOL 10 MG/ML
INJECTION, EMULSION INTRAVENOUS CONTINUOUS PRN
Status: DISCONTINUED | OUTPATIENT
Start: 2023-03-15 | End: 2023-03-15 | Stop reason: SDUPTHER

## 2023-03-15 RX ORDER — ROPIVACAINE HYDROCHLORIDE 5 MG/ML
INJECTION, SOLUTION EPIDURAL; INFILTRATION; PERINEURAL
Status: COMPLETED | OUTPATIENT
Start: 2023-03-15 | End: 2023-03-15

## 2023-03-15 RX ORDER — HYDROMORPHONE HYDROCHLORIDE 1 MG/ML
0.25 INJECTION, SOLUTION INTRAMUSCULAR; INTRAVENOUS; SUBCUTANEOUS EVERY 5 MIN PRN
Status: DISCONTINUED | OUTPATIENT
Start: 2023-03-15 | End: 2023-03-15 | Stop reason: HOSPADM

## 2023-03-15 RX ORDER — OXYCODONE HYDROCHLORIDE 5 MG/1
5 TABLET ORAL
Status: COMPLETED | OUTPATIENT
Start: 2023-03-15 | End: 2023-03-15

## 2023-03-15 RX ORDER — MIDAZOLAM HYDROCHLORIDE 1 MG/ML
INJECTION INTRAMUSCULAR; INTRAVENOUS PRN
Status: DISCONTINUED | OUTPATIENT
Start: 2023-03-15 | End: 2023-03-15 | Stop reason: SDUPTHER

## 2023-03-15 RX ORDER — DEXMEDETOMIDINE HYDROCHLORIDE 100 UG/ML
INJECTION, SOLUTION INTRAVENOUS
Status: COMPLETED | OUTPATIENT
Start: 2023-03-15 | End: 2023-03-15

## 2023-03-15 RX ORDER — ACETAMINOPHEN 325 MG/1
650 TABLET ORAL EVERY 6 HOURS PRN
COMMUNITY

## 2023-03-15 RX ORDER — DEXAMETHASONE SODIUM PHOSPHATE 10 MG/ML
8 INJECTION, SOLUTION INTRAMUSCULAR; INTRAVENOUS ONCE
Status: COMPLETED | OUTPATIENT
Start: 2023-03-15 | End: 2023-03-15

## 2023-03-15 RX ORDER — FENTANYL CITRATE 50 UG/ML
25 INJECTION, SOLUTION INTRAMUSCULAR; INTRAVENOUS EVERY 5 MIN PRN
Status: DISCONTINUED | OUTPATIENT
Start: 2023-03-15 | End: 2023-03-15 | Stop reason: HOSPADM

## 2023-03-15 RX ORDER — IPRATROPIUM BROMIDE AND ALBUTEROL SULFATE 2.5; .5 MG/3ML; MG/3ML
1 SOLUTION RESPIRATORY (INHALATION)
Status: DISCONTINUED | OUTPATIENT
Start: 2023-03-15 | End: 2023-03-15 | Stop reason: HOSPADM

## 2023-03-15 RX ORDER — ACETAMINOPHEN 500 MG
1000 TABLET ORAL ONCE
Status: COMPLETED | OUTPATIENT
Start: 2023-03-15 | End: 2023-03-15

## 2023-03-15 RX ORDER — LABETALOL HYDROCHLORIDE 5 MG/ML
10 INJECTION, SOLUTION INTRAVENOUS
Status: DISCONTINUED | OUTPATIENT
Start: 2023-03-15 | End: 2023-03-15 | Stop reason: HOSPADM

## 2023-03-15 RX ORDER — DROPERIDOL 2.5 MG/ML
0.62 INJECTION, SOLUTION INTRAMUSCULAR; INTRAVENOUS
Status: DISCONTINUED | OUTPATIENT
Start: 2023-03-15 | End: 2023-03-15 | Stop reason: HOSPADM

## 2023-03-15 RX ORDER — ONDANSETRON 2 MG/ML
INJECTION INTRAMUSCULAR; INTRAVENOUS PRN
Status: DISCONTINUED | OUTPATIENT
Start: 2023-03-15 | End: 2023-03-15 | Stop reason: SDUPTHER

## 2023-03-15 RX ADMIN — SODIUM CHLORIDE, POTASSIUM CHLORIDE, SODIUM LACTATE AND CALCIUM CHLORIDE 1000 ML: 600; 310; 30; 20 INJECTION, SOLUTION INTRAVENOUS at 09:53

## 2023-03-15 RX ADMIN — DEXAMETHASONE SODIUM PHOSPHATE 8 MG: 10 INJECTION, SOLUTION INTRAMUSCULAR; INTRAVENOUS at 09:53

## 2023-03-15 RX ADMIN — ACETAMINOPHEN 1000 MG: 500 TABLET ORAL at 09:42

## 2023-03-15 RX ADMIN — PROPOFOL 50 MCG/KG/MIN: 10 INJECTION, EMULSION INTRAVENOUS at 12:02

## 2023-03-15 RX ADMIN — SODIUM CHLORIDE, POTASSIUM CHLORIDE, SODIUM LACTATE AND CALCIUM CHLORIDE: 600; 310; 30; 20 INJECTION, SOLUTION INTRAVENOUS at 09:53

## 2023-03-15 RX ADMIN — ROPIVACAINE HYDROCHLORIDE 25 ML: 5 INJECTION, SOLUTION EPIDURAL; INFILTRATION; PERINEURAL at 10:45

## 2023-03-15 RX ADMIN — MIDAZOLAM 1 MG: 1 INJECTION INTRAMUSCULAR; INTRAVENOUS at 11:52

## 2023-03-15 RX ADMIN — ONDANSETRON HYDROCHLORIDE 4 MG: 2 INJECTION INTRAMUSCULAR; INTRAVENOUS at 13:09

## 2023-03-15 RX ADMIN — DEXMEDETOMIDINE HYDROCHLORIDE 0.2 ML: 100 INJECTION, SOLUTION INTRAVENOUS at 10:45

## 2023-03-15 RX ADMIN — Medication 2000 MG: at 12:06

## 2023-03-15 RX ADMIN — TRANEXAMIC ACID 1950 MG: 650 TABLET ORAL at 09:42

## 2023-03-15 RX ADMIN — MEPIVACAINE HYDROCHLORIDE 45 MG: 15 INJECTION, SOLUTION EPIDURAL; INFILTRATION at 11:50

## 2023-03-15 RX ADMIN — OXYCODONE 5 MG: 5 TABLET ORAL at 14:24

## 2023-03-15 RX ADMIN — CELECOXIB 200 MG: 100 CAPSULE ORAL at 09:42

## 2023-03-15 RX ADMIN — MIDAZOLAM 2 MG: 1 INJECTION INTRAMUSCULAR; INTRAVENOUS at 10:40

## 2023-03-15 RX ADMIN — MIDAZOLAM 1 MG: 1 INJECTION INTRAMUSCULAR; INTRAVENOUS at 11:37

## 2023-03-15 RX ADMIN — OXYCODONE HYDROCHLORIDE 5 MG: 5 TABLET ORAL at 15:29

## 2023-03-15 ASSESSMENT — PAIN SCALES - GENERAL
PAINLEVEL_OUTOF10: 4
PAINLEVEL_OUTOF10: 7
PAINLEVEL_OUTOF10: 7
PAINLEVEL_OUTOF10: 5

## 2023-03-15 ASSESSMENT — PAIN DESCRIPTION - DESCRIPTORS
DESCRIPTORS: ACHING

## 2023-03-15 ASSESSMENT — PAIN - FUNCTIONAL ASSESSMENT: PAIN_FUNCTIONAL_ASSESSMENT: 0-10

## 2023-03-15 ASSESSMENT — PAIN DESCRIPTION - LOCATION
LOCATION: KNEE
LOCATION: KNEE

## 2023-03-15 ASSESSMENT — PAIN DESCRIPTION - ORIENTATION
ORIENTATION: LEFT
ORIENTATION: LEFT

## 2023-03-15 NOTE — DISCHARGE INSTRUCTIONS
Jose Marin III, MD Rina Johns, PA-C    Lower Extremity Surgery  Discharge Instructions      Please take the time to review the following instructions before you leave the hospital and use them as guidelines during your recovery from surgery. If you have any questions you may contact my office at [phone]. In the event of an emergency please call 911 or have someone take you to the nearest emergency room. Wound Care/Dressing Changes:    [x]   You may change your dressing as needed. Beginning the 2 days after you are discharged from the hospital you can change your dressing daily. A big, bulky dressing isn't necessary as long as there isn't any drainage from the incisions. There will be a piece of mesh tape over your incision that resembles gauze. This tape should stay on and you should not attempt to remove it. Showering/Bathing:    [x]     You may shower 2 days after surgery. Your dressing may be removed for showering. Do not soak your incision underwater. Weight Bearing Status/Braces/Activity:    You are weight bearing as tolerated on the operative extremity. Ice/Elevation:    Continue ice and elevation consistently for 48 hours after surgery. Medication:    You will be given a prescription for pain medications. This should have been given at your preop appointment. Refills of pain medication are authorized during office hours only (8AM - 5PM Mon thru Fri). You can take 1000 mg of Tylenol every 8 hours. Do not exceed 3000 mg of Tylenol per day. If you have been given Norco for post operative pain, do not take the Tylenol. You should use Aspirin 81 mg twice daily for 21 days from the date of your surgery. This will help to prevent blood clots from forming in your legs. This should be started at dinner the day of your surgery. If you are taking another medication such as Xarelto this should also be started the day after the surgery.   You may resume the medications you were taking prior to your surgery, unless otherwise specified in your discharge instructions. You are to take an oral antibiotic when you get home from the surgery until the prescription is done. This should have been provided at the pre-operative appointment. This antibiotic is generally Keflex or Clindamycin. Follow Up Appointment:    If you are unsure of your follow-up appointment date and time, please call [phone]. Please let our  know you are scheduling a post-op appointment. Most appointments should be between 7-14 days after your surgery. Physical Therapy:    [x]   You are to participate with Home Health Physical Therapy as arranged pre-operatively in the convenience of your own home. Important signs and symptoms:    If any of the following signs and symptoms occurs, you should contact Dr. John Michel' office. Please be advised if a problem arises which you feel required immediate medical attention or your are unable to contact Dr. John Michel' office you should seek immediate medical attention. Signs and symptoms to watch for include:  A sudden increase in swelling and/or redness or warmth at the area your surgery was performed which isn't relieved by rest, ice and elevation. Oral temperature greater than 101.5 degrees for 12 hours or more which isn't relieved by an increase in fluid intake and taking two Tylenol every 4-6 hours. Do not exceed 3000 mg of Tylenol per day. Excessive drainage from your incisions or drainage that hasn't stopped by 72 hours. Calf pain, tenderness, redness or swelling which isn't relieved with rest and elevation. Fever, chills, shortness of breath, chest pain, nausea, vomiting or other signs and symptoms which are of concern to you.    DISCHARGE SUMMARY from Nurse    PATIENT INSTRUCTIONS:    After general anesthesia or intravenous sedation, for 24 hours or while taking prescription Narcotics:  Limit your activities  Do not drive and operate hazardous machinery  Do not make important personal or business decisions  Do  not drink alcoholic beverages  If you have not urinated within 8 hours after discharge, please contact your surgeon on call. Report the following to your surgeon:  Excessive pain, swelling, redness or odor of or around the surgical area  Temperature over 100.5  Nausea and vomiting lasting longer than 4 hours or if unable to take medications  Any signs of decreased circulation or nerve impairment to extremity: change in color, persistent  numbness, tingling, coldness or increase pain  Any questions    What to do at Home:  Recommended activity: activity as tolerated, and no driving until cleared by Dr. Shakila Mcnamara. *  Please give a list of your current medications to your Primary Care Provider. *  Please update this list whenever your medications are discontinued, doses are      changed, or new medications (including over-the-counter products) are added. *  Please carry medication information at all times in case of emergency situations. These are general instructions for a healthy lifestyle:    No smoking/ No tobacco products/ Avoid exposure to second hand smoke  Surgeon General's Warning:  Quitting smoking now greatly reduces serious risk to your health. Obesity, smoking, and sedentary lifestyle greatly increases your risk for illness    A healthy diet, regular physical exercise & weight monitoring are important for maintaining a healthy lifestyle    You may be retaining fluid if you have a history of heart failure or if you experience any of the following symptoms:  Weight gain of 3 pounds or more overnight or 5 pounds in a week, increased swelling in our hands or feet or shortness of breath while lying flat in bed. Please call your doctor as soon as you notice any of these symptoms; do not wait until your next office visit.         The discharge information has been reviewed with the patient and caregiver. The patient and caregiver verbalized understanding. Discharge medications reviewed with the patient and caregiver and appropriate educational materials and side effects teaching were provided.   Patient armband removed and shredded   ___________________________________________________________________________________________________________________________________

## 2023-03-15 NOTE — PROGRESS NOTES
Physical Therapy Goals:  Pt goals to be met in 1 day:  Pt will be able to perform supine<>sit SBA for transfer ease at home. Pt will be able to perform sit<>stand SBA for increased ability to transfer at home safely. Pt will be able to participate in gait training >100' w/ RW, WBAT, GB and CGA/SBA for improved ability in home upon d/c. Pt will be able to perform stair training step to pattern, B/U rail and CGA to obtain safe entry into home upon d/c. Pt will be educated regarding HEP per MD protocol for optimal AROM/strength outcomes. [x]  Patient has met MD mobilization critieria for d/c home   [x]  Recommend HH with 24 hour adult care   []  Benefit from additional acute PT session to address:      PHYSICAL THERAPY EVALUATION    Patient: Lina Lopez (69 y.o. male)  Date: 3/15/2023  Primary Diagnosis: Osteoarthritis of left knee, unspecified osteoarthritis type [M17.12]  Procedure(s) (LRB):  LEFT TOTAL KNEE ARTHROPLASTY (Left) Day of Surgery   Precautions: Weight Bearing, Fall Risk,  , Left Lower Extremity Weight Bearing: Weight Bearing As Tolerated,  ,  ,  ,  ,    PLOF: Independent    ASSESSMENT :  Based on the objective data described below, the patient presents with decreased mobility in regards to bed mobility, transfers, gait quality, gait tolerance, balance, stair negotiation and safety due to L TKA surgery. Decreased AROM of L knee, dec strength L knee, pain in L knee, decreased sensation of L knee, also impacting functional mobility. Using numerical pain scale, pt rated pain 7/10 pre/during/post session. Pt and caregiver ed regarding mobility safety, WB, HEP, ice application/use, elevation, environmental safety and home safe techniques. Pt sitting in recliner upon arrival.  Able to perform sit<>stand w/ SBA. Safety vc required throughout session to reinforce safety. Gait training using RW, GB, WBAT and CGA w/ antalgic gait pattern. Stair training using step to pattern, B rail use and CGA. Answered all questions by pt and caregiver in regards to PT and mobility. Pt left sitting in recliner w/ all needs within reach and ice pack to L knee. Nurse Tomer Schneider aware of session and outcomes. Recommend HHPT w/ responsible adult care at least 24 hours upon hospital d/c.  DEFICITS/IMPAIRMENTS:    Body Structures, Functions, Activity Limitations Requiring Skilled Therapeutic Intervention: Decreased functional mobility ; Decreased safe awareness;Decreased cognition;Decreased coordination;Decreased balance;Decreased sensation;Decreased endurance;Decreased strength;Decreased tolerance to work activity    Patient will benefit from skilled intervention to address the above impairments. Patient's rehabilitation potential is considered to be Therapy Prognosis: Good. Factors which may influence rehabilitation potential include:   []         None noted  []         Mental ability/status  []         Medical condition  []         Home/family situation and support systems  []         Safety awareness  [x]         Pain tolerance/management  []         Other:      PLAN :  Recommendations and Planned Interventions:   Strengthening;ROM;Balance training;Functional mobility training;Transfer training;Stair training;Gait training; Endurance training; Therapeutic activities; Patient/Caregiver education & training; Safety education & training;Positioning;Modalities    Frequency/Duration: Patient will be followed by physical therapy to address goals, 1-2 times per day/3-5 days per week to address goals. Further Equipment Recommendations for Discharge:  , No,    Discharge Recommendation: Discharge Recommendations: Home with Home health PT    AMPAC: 18/24    This AMPAC score should be considered in conjunction with interdisciplinary team recommendations to determine the most appropriate discharge setting. Patient's social support, diagnosis, medical stability, and prior level of function should also be taken into consideration. SUBJECTIVE:   Patient stated I am ready to get out of long-term.     OBJECTIVE DATA SUMMARY:     Past Medical History:   Diagnosis Date    Arthritis     osteo    GERD (gastroesophageal reflux disease)     Hypercholesteremia      Past Surgical History:   Procedure Laterality Date    BACK SURGERY      cervical neck 2019 with plate and E4-X9 disc replacement surgery    COLONOSCOPY N/A 12/21/2022    COLONOSCOPY; CLIP PLACEMENT X 5  ASCENDING COLON; POLYPECTOMY performed by Riri Ash MD at [facility] ENDOSCOPY    COLONOSCOPY N/A 12/20/2022    COLONOSCOPY; SCLERO INJECTION WITH SALINE; POLYPECTOMY performed by Riri Ash MD at [facility] ENDOSCOPY    COLONOSCOPY      IR INJ EPIDURAL CERV/THOR STEROID WO [facility] Left 2014    left hand reattachment    TONSILLECTOMY      VASECTOMY       Barriers to Learning/Limitations: physical and other anesthesia  Compensate with: visual, verbal, tactile, kinesthetic cues/model  Home Situation:  Social/Functional History  Lives With: Spouse  Type of Home: House  Home Layout: Multi-level  Home Access: Stairs to enter with rails  Entrance Stairs - Number of Steps: 18  Entrance Stairs - Rails: Right  Bathroom Shower/Tub: Walk-in shower  Home Equipment: Ivania Lopez, rolling, Cane  ADL Assistance: Independent  Ambulation Assistance: Independent  Transfer Assistance: Independent  Critical Behavior:  Orientation Level: Oriented to place;Oriented to situation;Oriented to person  [facility]  Strength:    Strength: Generally decreased, functional  Tone & Sensation:   Tone: Normal  Sensation: Impaired (L knee)  Range Of Motion:  AROM: Generally decreased, functional  PROM: Generally decreased, functional  Functional Mobility:  Bed Mobility:  Bed Mobility Training  Bed Mobility Training: No  Transfers:  Transfer Training  Transfer Training: Yes  Sit to Stand: Stand-by assistance  Stand to Sit: Stand-by assistance  Balance:   Balance  Sitting: Intact  Standing: Impaired  Standing - Static: Good;Constant support  Standing - Dynamic: Fair  Wheelchair Mobility:  Ambulation/Gait Training:  Gait Training  Gait Training: Yes  Left Side Weight Bearing: As tolerated  Gait  Overall Level of Assistance: Contact-guard assistance  Interventions: Safety awareness training; Tactile cues; Verbal cues  Base of Support: Shift to right  Speed/Liz: Slow  Step Length: Left shortened;Right shortened  Swing Pattern: Left asymmetrical;Right asymmetrical  Stance: Left decreased  Gait Abnormalities: Antalgic;Decreased step clearance; Step to gait  Distance (ft): 140 Feet  Assistive Device: Gait belt;Walker, rolling  Rail Use: Both  Stairs - Level of Assistance: Contact-guard assistance  Number of Stairs Trained: 5  Therapeutic Exercises/Neuromuscular Re-education:     Pain:  Pain level pre-treatment: 7/10   Pain level post-treatment: 7/10   Pain Intervention(s): Medication (see MAR); Rest, Ice, Repositioning  Response to intervention: Nurse notified, See doc flow    Activity Tolerance:   Activity Tolerance: Patient tolerated evaluation without incident  Please refer to the flowsheet for vital signs taken during this treatment. After treatment:   [x]         Patient left in no apparent distress sitting up in chair  []         Patient left in no apparent distress in bed  [x]         Call bell left within reach  [x]         Nursing notified  [x]         Caregiver present  []         Bed alarm activated  []         SCDs applied    COMMUNICATION/EDUCATION:   Patient Education  Education Given To: Patient; Family  Education Provided: Role of Therapy; Fall Prevention Strategies;Transfer Training  Education Method: Demonstration;Verbal;Teach Back  Education Outcome: Verbalized understanding;Demonstrated understanding;Continued education needed    Thank you for this referral.  Chano Cuevas, PT  Minutes: 23      Eval Complexity: [facility] AM-PAC® Basic Mobility Inpatient Short Form (6-Clicks) Version 2    How much HELP from another person does the patient currently need    (If the patient hasn't done an activity recently, how much help from another person do you think he/she would need if he/she tried?)   Total (Total A or Dep)   A Lot  (Mod to Max A)   A Little (Sup or Min A)   None (Mod I to I)   Turning from your back to your side while in a flat bed without using bedrails? [] 1 [] 2 [x] 3 [] 4   2. Moving from lying on your back to sitting on the side of a flat bed without using bedrails? [] 1 [] 2 [x] 3 [] 4   3. Moving to and from a bed to a chair (including a wheelchair)? [] 1 [] 2 [x] 3 [] 4   4. Standing up from a chair using your arms (e.g., wheelchair, or bedside chair)? [] 1 [] 2 [x] 3 [] 4   5. Walking in hospital room? [] 1 [] 2 [x] 3 [] 4   6. Climbing 3-5 steps with a railing?+   [] 1 [] 2 [x] 3 [] 4   +If stair climbing cannot be assessed, skip item #6. Sum responses from items 1-5. Current research shows that an AM-PAC score of 18 (14 without stairs) or greater is associated with a discharge to the patient's home setting. Based on an AM-PAC score of 18/24 (or **/20 if omitting stairs) and their current functional mobility deficits, it is recommended that the patient have 2-3 sessions per week of Physical Therapy at d/c to increase the patient's independence.

## 2023-03-15 NOTE — PROGRESS NOTES
Occupational Therapy Goals:  Initiated 3/15/2023 to be met within 7 days. Patient will perform toileting with mod I  Patient will perform lower body dressing with mod I and A/E PRN  Patient will perform bathroom mobility with mod I  Patient will perform grooming standing at sink with mod I  Patient will perform toilet transfer with mod I         OCCUPATIONAL THERAPY EVALUATION    Patient: Nisreen Mcgowan (76 y.o. male)  Date: 3/15/2023  Primary Diagnosis: Osteoarthritis of left knee, unspecified osteoarthritis type [M17.12]  Procedure(s) (LRB):  LEFT TOTAL KNEE ARTHROPLASTY (Left) Day of Surgery   Precautions: Fall Risk, Weight Bearing,  , Left Lower Extremity Weight Bearing: Weight Bearing As Tolerated,  ,  ,  ,  ,    PLOF: pt independent for ADLs/functional mobility    ASSESSMENT :  Based on the objective data described below, the patient presents with LLE decreased ROM and strength affecting LE ADLs. Pt found seated in recliner chair, vitals assessed and WNL, pt reporting pain 7/10, agreeable to therapy. Educated pt on proper body mechanics for ADLs s/p TKR. Pt completed upper body dressing with supervision. Pt able to thread B feet through underwear/pants with min A, and CGA when standing to pull up to waist. Pt required SBA for STS/bathroom mobility with vc for safe use of RW. Pt ambulated back to recliner, ice applied to L knee. Spouse present during session for education on home safety. Provided opportunity for pt to voice questions on ADL performance when home, pt has no further concerns. Patient will benefit from skilled Occupational Therapy intervention to maximize safety/independence with ADLs at d/c.    DEFICITS/IMPAIRMENTS:  Performance deficits / Impairments: Decreased functional mobility ; Decreased high-level IADLs;Decreased ADL status; Decreased endurance;Decreased ROM; Decreased strength;Decreased balance;Decreased sensation    Patient will benefit from skilled intervention to address the above impairments.  Patient's rehabilitation potential is considered to be Prognosis: Good.  Factors which may influence rehabilitation potential include:   [x]             None noted  []             Mental ability/status  []             Medical condition  []             Home/family situation and support systems  []             Safety awareness  []             Pain tolerance/management  []             Other:      PLAN :  Recommendations and Planned Interventions:   Strengthening;ROM;Balance training;Functional mobility training;Endurance training;Safety education & training;Patient/Caregiver education & training;Self-Care / ADL    Frequency/Duration: Patient will be followed by occupational therapy to address goals, 1-2 times per day/3-5 days per week to address goals.    Further Equipment Recommendations for Discharge: n/a    Discharge Recommendation: Discharge Recommendations: Home with Home health OT    AMPAC: Current research shows that an AM-PAC score of 18 or greater is associated with a discharge to the patient's home setting.  Based on an AM-PAC score of 19/24 and their current ADL deficits; it is recommended that the patient have 2-3 sessions per week of Occupational Therapy at d/c to increase the patient's independence.      This AMPAC score should be considered in conjunction with interdisciplinary team recommendations to determine the most appropriate discharge setting. Patient's social support, diagnosis, medical stability, and prior level of function should also be taken into consideration.     SUBJECTIVE:   Patient stated \"I'm fine, I already walked to the bathroom”    OBJECTIVE DATA SUMMARY:     Past Medical History:   Diagnosis Date    Arthritis     osteo    GERD (gastroesophageal reflux disease)     Hypercholesteremia      Past Surgical History:   Procedure Laterality Date    BACK SURGERY      cervical neck 2019 with plate and l4-l5 disc replacement surgery    COLONOSCOPY N/A 12/21/2022    COLONOSCOPY;  CLIP PLACEMENT X 5  ASCENDING COLON; POLYPECTOMY performed by Yohana Diaz MD at [facility] ENDOSCOPY    COLONOSCOPY N/A 12/20/2022    COLONOSCOPY; [address]; POLYPECTOMY performed by Yohana Diaz MD at [facility] ENDOSCOPY    COLONOSCOPY      IR INJ EPIDURAL CERV/THOR STEROID WO IMG      ORTHOPEDIC SURGERY Left 2014    left hand reattachment    TONSILLECTOMY      VASECTOMY       Barriers to Learning/Limitations: physical  Compensate with: visual, verbal, tactile, kinesthetic cues/model    Home Situation:   Social/Functional History  Lives With: Spouse  Type of Home: House  Home Layout: Two level  Home Access: Stairs to enter with rails  Entrance Stairs - Number of Steps: 3  Entrance Stairs - Rails: Right  Bathroom Shower/Tub: Walk-in shower  Home Equipment: Walker, rolling, Cane  ADL Assistance: Independent  Ambulation Assistance: Independent  Transfer Assistance: Independent  []  Right hand dominant   []  Left hand dominant    Cognitive/Behavioral Status:  Orientation Level: Oriented to place;Oriented to situation;Oriented to person  WNL     Coordination: BUE  Coordination: Within functional limits    Balance:  Balance  Sitting: Intact  Standing: Impaired  Standing - Static: Good;Constant support  Standing - Dynamic: Fair    Strength: BUE  Strength: Generally decreased, functional    Tone & Sensation: BUE  Tone: Normal  Sensation: Impaired (L knee)    Range of Motion: BUE  AROM: Generally decreased, functional  PROM: Generally decreased, functional    Functional Mobility and Transfers for ADLs:  Bed Mobility:  Bed Mobility Training  Bed Mobility Training: No  Transfers:  Transfer Training  Transfer Training: Yes  Sit to Stand: Stand-by assistance  Stand to Sit: Stand-by assistance    ADL Assessment:   Feeding: Independent  Grooming: Stand by assistance  UE Bathing: Supervision  LE Bathing: Minimal assistance  UE Dressing: Supervision  LE Dressing: Minimal assistance  Toileting: Stand by assistance    Pain:  Pain level pre-treatment: 7/10   Pain level post-treatment: 7/10   Pain Intervention(s): Rest, Ice, Repositioning   Response to intervention: Nurse notified    Activity Tolerance:   Activity Tolerance: Patient Tolerated treatment well  Please refer to the flowsheet for vital signs taken during this treatment. After treatment:   [x] Patient left in no apparent distress sitting up in chair  [] Patient left in no apparent distress in bed  [] Call bell left within reach  [] Nursing notified  [x] Caregiver present  [] Bed alarm activated    COMMUNICATION/EDUCATION:   Patient Education  Education Given To: Patient; Family  Education Provided: Role of Therapy; Energy Conservation; Fall Prevention Strategies; Plan of Care;IADL Safety;Precautions; Family Education; ADL Adaptive Strategies;Transfer Training  Education Method: Demonstration;Verbal  Barriers to Learning: None  Education Outcome: Verbalized understanding    Thank you for this referral.  Sela Duane, OT  Minutes: 23    Eval Complexity: [facility] AM-PAC® Daily Activity Inpatient Short Form (6-Clicks)*    How much HELP from another person does the patient currently need    (If the patient hasn't done an activity recently, how much help from another person do you think he/she would need if he/she tried?)   Total (Total A or Dep)   A Lot  (Mod to Max A)   A Little (Sup or Min A)   None (Mod I to I)   Putting on and taking off regular lower body clothing? [] 1 [] 2 [x] 3 [] 4   2. Bathing (including washing, rinsing,      drying)? [] 1 [] 2 [x] 3 [] 4   3. Toileting, which includes using toilet, bedpan or urinal?   [] 1 [] 2 [x] 3 [] 4   4. Putting on and taking off regular upper body clothing? [] 1 [] 2 [x] 3 [] 4   5. Taking care of personal grooming such as brushing teeth? [] 1 [] 2 [x] 3 [] 4   6. Eating meals?    [] 1 [] 2 [] 3 [x] 4     Current research shows that an AM-PAC score of 18 or greater is associated with a discharge to the patient's home setting. Based on an AM-PAC score of 19/24 and their current ADL deficits; it is recommended that the patient have 2-3 sessions per week of Occupational Therapy at d/c to increase the patient's independence.

## 2023-03-15 NOTE — ANESTHESIA PRE PROCEDURE
Department of Anesthesiology  Preprocedure Note       Name:  Denice Buckley   Age:  57 y.o.  :  1960                                          MRN:  [id number]         Date:  3/15/2023      Surgeon: Comfort Fraser):  Clarence Lebron MD    Procedure: Procedure(s):  LEFT TOTAL KNEE ARTHROPLASTY    Medications prior to admission:   Prior to Admission medications    Medication Sig Start Date End Date Taking?  Authorizing Provider   cephALEXin (KEFLEX) 500 MG capsule Take 1,000 mg by mouth See Admin Instructions Pt followed office instructions   Yes Historical Provider, MD   acetaminophen (TYLENOL) 325 MG tablet Take 650 mg by mouth every 6 hours as needed for Pain   Yes Historical Provider, MD   rosuvastatin (CRESTOR) 5 MG tablet Take 5 mg by mouth daily  Patient not taking: Reported on 3/15/2023 12/12/22   Historical Provider, MD   omeprazole (PRILOSEC OTC) 20 MG tablet Take 20 mg by mouth daily    Historical Provider, MD   loratadine (CLARITIN) 10 MG tablet Take 10 mg by mouth daily    Historical Provider, MD   S[place] 500 MG CAPS Take 1,000 mg by mouth daily    Historical Provider, MD   Cholecalciferol 50 MCG ( UT) TABS Take by mouth daily    Historical Provider, MD   Multiple Vitamin (MULTIVITAMIN ADULT PO) Take 1 tablet by mouth daily    Historical Provider, MD   vitamin C (ASCORBIC ACID) 500 MG tablet Take 500 mg by mouth daily    Historical Provider, MD   FERROUS SULFATE PO Take 28 mg by mouth daily    Historical Provider, MD   methocarbamol (ROBAXIN) 500 MG tablet Take 500 mg by mouth 4 times daily as needed 20   Historical Provider, MD   tadalafil (CIALIS) 5 MG tablet Take 5 mg by mouth daily as needed    Historical Provider, MD   ibuprofen (ADVIL;MOTRIN) 200 MG tablet Take 200 mg by mouth every 6 hours as needed    Historical Provider, MD   fluticasone (FLONASE) 50 MCG/ACT nasal spray 2 sprays by Nasal route as needed    Historical Provider, MD   amoxicillin (AMOXIL) 500 MG capsule Take 500 mg by mouth once Indications: prior to dental procedures    Historical Provider, MD       Current medications:    Current Facility-Administered Medications   Medication Dose Route Frequency Provider Last Rate Last Admin    dexamethasone (PF) (DECADRON) injection 8 mg  8 mg IntraVENous Once [facility], PA-C        tranexamic acid (LYSTEDA) tablet 1,950 mg  1,950 mg Oral 60 Min Pre-Op Larnell Livonia Schwizer, PA-C   1,950 mg at 03/15/23 6639    lactated ringers IV soln infusion 1,000 mL  1,000 mL IntraVENous Continuous Larnell Livonia Schwizer, PA-C        sodium chloride flush 0.9 % injection 5-40 mL  5-40 mL IntraVENous 2 times per day [facility], PA-JOSEPH        sodium chloride flush 0.9 % injection 5-40 mL  5-40 mL IntraVENous PRN Larnell Livonia Schwizer, PA-C        0.9 % sodium chloride infusion   IntraVENous PRN Larnell Livonia Schwizer, PA-JOSEPH        ceFAZolin (ANCEF) 2000 mg in sterile water 20 mL IV syringe  2,000 mg IntraVENous On Call to [address] [place] [address], PA-JOSEPH        pantoprazole (PROTONIX) tablet 40 mg  40 mg Oral Once [facility], PA-JOSEPH        sodium chloride flush 0.9 % injection 5-40 mL  5-40 mL IntraVENous 2 times per day Pato Mendez MD        sodium chloride flush 0.9 % injection 5-40 mL  5-40 mL IntraVENous PRN Pato Mendez MD        0.9 % sodium chloride infusion   IntraVENous PRN Pato Mendez MD        lactated ringers IV soln infusion   IntraVENous Continuous Yessy Palm MD           Allergies: Allergies   Allergen Reactions    Shellfish Allergy Anaphylaxis     Patient stated that topical iodine does not cause any reactions.       Morphine Other (See Comments)     Unable to void       Problem List:    Patient Active Problem List   Diagnosis Code    Radiculopathy of arm M54.10    Cervical spondylosis M47.812    Cervical spinal stenosis M48.02    Primary osteoarthritis of left knee M17.12    Osteoarthritis of left knee, unspecified osteoarthritis type M17.12       Past Medical History:        Diagnosis Date    Arthritis     osteo    GERD (gastroesophageal reflux disease)     Hypercholesteremia        Past Surgical History:        Procedure Laterality Date    BACK SURGERY      cervical neck 2019 with plate and B8-M1 disc replacement surgery    COLONOSCOPY N/A 12/21/2022    COLONOSCOPY; CLIP PLACEMENT X 5  ASCENDING COLON; POLYPECTOMY performed by Alvin Navarro MD at [address] COLONOSCOPY N/A 12/20/2022    COLONOSCOPY; SCLERO INJECTION WITH SALINE; POLYPECTOMY performed by Alvin Navarro MD at [address] FENG INJ EPIDURAL CERV/THOR STEROID WO IMG      ORTHOPEDIC SURGERY Left 2014    left hand reattachment    TONSILLECTOMY      VASECTOMY         Social History:    Social History     Tobacco Use    Smoking status: Never    Smokeless tobacco: Never   Substance Use Topics    Alcohol use:  Yes     Alcohol/week: 12.0 standard drinks     Types: 12 Cans of beer per week                                Counseling given: Not Answered      Vital Signs (Current):   Vitals:    03/15/23 0906   BP: (!) 149/76   Pulse: 71   Resp: 10   Temp: 97.8 °F (36.6 °C)   TempSrc: Temporal   SpO2: 99%                                              BP Readings from Last 3 Encounters:   03/15/23 (!) 149/76       NPO Status: Time of last liquid consumption: 1900                        Time of last solid consumption: 0600 (sips with pills this AM)                        Date of last liquid consumption: 03/14/23                        Date of last solid food consumption: 03/14/23    BMI:   Wt Readings from Last 3 Encounters:   03/02/23 260 lb (117.9 kg)     There is no height or weight on file to calculate BMI.    CBC:   Lab Results   Component Value Date/Time    WBC 10.4 12/21/2022 02:43 AM    RBC 4.62 12/21/2022 02:43 AM    HGB 13.5 12/21/2022 02:43 AM    HCT 41.5 12/21/2022 02:43 AM    MCV 89.8 12/21/2022 02:43 AM    RDW 12.2 12/21/2022 02:43 AM     12/21/2022 02:43 AM       CMP:   Lab Results   Component Value Date/Time     12/21/2022 02:43 AM    K 3.8 12/21/2022 02:43 AM     12/21/2022 02:43 AM    CO2 24 12/21/2022 02:43 AM    BUN 19 12/21/2022 02:43 AM    CREATININE 1.04 12/21/2022 02:43 AM    GFRAA >60 06/12/2020 11:50 AM    AGRATIO 1.4 12/21/2022 02:43 AM    GLUCOSE 161 12/21/2022 02:43 AM    PROT 6.6 12/21/2022 02:43 AM    CALCIUM 8.3 12/21/2022 02:43 AM    BILITOT 0.6 12/21/2022 02:43 AM    ALKPHOS 61 12/21/2022 02:43 AM    AST 47 12/21/2022 02:43 AM     12/21/2022 02:43 AM       POC Tests: No results for input(s): POCGLU, POCNA, POCK, POCCL, POCBUN, POCHEMO, POCHCT in the last 72 hours.    Coags:   Lab Results   Component Value Date/Time    PROTIME 13.2 06/12/2020 11:50 AM    INR 1.0 06/12/2020 11:50 AM    APTT 25.3 06/12/2020 11:50 AM       HCG (If Applicable): No results found for: PREGTESTUR, PREGSERUM, HCG, HCGQUANT     ABGs: No results found for: PHART, PO2ART, BGW4ZYK, PJA1IAO, BEART, O6KEFHOD     Type & Screen (If Applicable):  No results found for: LABABO, LABRH    Drug/Infectious Status (If Applicable):  No results found for: HIV, HEPCAB    COVID-19 Screening (If Applicable):   Lab Results   Component Value Date/Time    COVID19 Not Detected 06/17/2020 12:00 PM           Anesthesia Evaluation  Patient summary reviewed and Nursing notes reviewed no history of anesthetic complications:   Airway: Mallampati: II  TM distance: >3 FB   Neck ROM: full  Mouth opening: > = 3 FB   Dental:    (+) caps and bridge      Pulmonary:Negative Pulmonary ROS                              Cardiovascular:  Exercise tolerance: good (>4 METS),   (+) hyperlipidemia                  Neuro/Psych:   Negative Neuro/Psych ROS     (-) neuromuscular disease           GI/Hepatic/Renal:   (+) GERD:,           Endo/Other:    (+) : arthritis:., .                 Abdominal:             Vascular: negative vascular  ROS.         Other Findings:                   William Vasquez MD   3/15/2023      ASA 2    ACB - risks/benefits explained: infection, nerve injury, bleeding, failed block - he wishes to proceed. Spinal - risks/benefits explained: infection, nerve injury, bleeding, headache, back pain, failed spinal which would necessitate GA - he understands and wishes to proceed.

## 2023-03-15 NOTE — ANESTHESIA PRE PROCEDURE
Department of Anesthesiology  Preprocedure Note       Name:  Drew Mohamud   Age:  57 y.o.  :  1960                                          MRN:  [id number]         Date:  3/15/2023      Surgeon: Minna Pfeiffer):  Angelia Christopher MD    Procedure: Procedure(s):  LEFT TOTAL KNEE ARTHROPLASTY    Medications prior to admission:   Prior to Admission medications    Medication Sig Start Date End Date Taking?  Authorizing Provider   cephALEXin (KEFLEX) 500 MG capsule Take 1,000 mg by mouth See Admin Instructions Pt followed office instructions   Yes Historical Provider, MD   acetaminophen (TYLENOL) 325 MG tablet Take 650 mg by mouth every 6 hours as needed for Pain   Yes Historical Provider, MD   Aspirin 81 MG CAPS Take 81 mg by mouth in the morning and at bedtime 3/15/23  Yes Constance Halsted, PA-C   omeprazole (PRILOSEC OTC) 20 MG tablet Take 20 mg by mouth daily    Historical Provider, MD   loratadine (CLARITIN) 10 MG tablet Take 10 mg by mouth daily    Historical Provider, MD   Cholecalciferol 50 MCG ( UT) TABS Take by mouth daily    Historical Provider, MD   FERROUS SULFATE PO Take 28 mg by mouth daily    Historical Provider, MD   methocarbamol (ROBAXIN) 500 MG tablet Take 500 mg by mouth 4 times daily as needed 20   Historical Provider, MD   fluticasone (FLONASE) 50 MCG/ACT nasal spray 2 sprays by Nasal route as needed    Historical Provider, MD   amoxicillin (AMOXIL) 500 MG capsule Take 500 mg by mouth once Indications: prior to dental procedures    Historical Provider, MD       Current medications:    Current Facility-Administered Medications   Medication Dose Route Frequency Provider Last Rate Last Admin    lactated ringers IV soln infusion   IntraVENous Continuous Jackie Perkins MD        sodium chloride flush 0.9 % injection 5-40 mL  5-40 mL IntraVENous 2 times per day Jackie Perkins MD        sodium chloride flush 0.9 % injection 5-40 mL  5-40 mL IntraVENous PRN Nanette Jaureguia Korey Reyes MD        0.9 % sodium chloride infusion   IntraVENous PRN Ty Core, MD        fentaNYL (SUBLIMAZE) injection 25 mcg  25 mcg IntraVENous Q5 Min PRN Ty Core, MD        HYDROmorphone HCl PF (DILAUDID) injection 0.25 mg  0.25 mg IntraVENous Q5 Min PRN Ty Core, MD        ondansetron [facility]) injection 4 mg  4 mg IntraVENous Once PRN Ty Core, MD        droperidol (INAPSINE) injection 0.625 mg  0.625 mg IntraVENous Once PRN Ty Core, MD        diphenhydrAMINE (BENADRYL) injection 12.5 mg  12.5 mg IntraVENous Once PRN Ty Core, MD        labetalol (NORMODYNE;TRANDATE) injection 10 mg  10 mg IntraVENous Q15 Min PRN Ty Core, MD        ipratropium-albuterol (DUONEB) nebulizer solution 1 ampule  1 ampule Inhalation Once PRN Ty Core, MD        lactated ringers IV soln infusion   IntraVENous Continuous Susanne Denton III,  mL/hr at 03/15/23 0953 New Bag at 03/15/23 0953       Allergies: Allergies   Allergen Reactions    Shellfish Allergy Anaphylaxis     Patient stated that topical iodine does not cause any reactions.       Morphine Other (See Comments)     Unable to void       Problem List:    Patient Active Problem List   Diagnosis Code    Radiculopathy of arm M54.10    Cervical spondylosis M47.812    Cervical spinal stenosis M48.02       Past Medical History:        Diagnosis Date    Arthritis     osteo    GERD (gastroesophageal reflux disease)     Hypercholesteremia        Past Surgical History:        Procedure Laterality Date    BACK SURGERY      cervical neck 2019 with plate and T7-S2 disc replacement surgery    COLONOSCOPY N/A 12/21/2022    COLONOSCOPY; CLIP PLACEMENT X 5  ASCENDING COLON; POLYPECTOMY performed by Jon Felix MD at [address] COLONOSCOPY N/A 12/20/2022    COLONOSCOPY; SCLERO INJECTION WITH SALINE; POLYPECTOMY performed by Jon Felix MD at [address]  IR INJ EPIDURAL CERV/THOR STEROID WO IMG      ORTHOPEDIC SURGERY Left 2014    left hand reattachment    TONSILLECTOMY      VASECTOMY         Social History:    Social History     Tobacco Use    Smoking status: Never    Smokeless tobacco: Never   Substance Use Topics    Alcohol use:  Yes     Alcohol/week: 12.0 standard drinks     Types: 12 Cans of beer per week                                Counseling given: Not Answered      Vital Signs (Current):   Vitals:    03/15/23 1415 03/15/23 1420 03/15/23 1436 03/15/23 1441   BP: 137/79 (!) 148/68 (!) 158/74 (!) 156/82   Pulse: 66 68 71 74   Resp: 16 13 18   Temp:    97.5 °F (36.4 °C)   TempSrc:    Temporal   SpO2: 100% 100% 100% 100%                                              BP Readings from Last 3 Encounters:   03/15/23 (!) 156/82       NPO Status: Time of last liquid consumption: 1900                        Time of last solid consumption: 0600 (sips with pills this AM)                        Date of last liquid consumption: 03/14/23                        Date of last solid food consumption: 03/14/23    BMI:   Wt Readings from Last 3 Encounters:   03/02/23 260 lb (117.9 kg)     There is no height or weight on file to calculate BMI.    CBC:   Lab Results   Component Value Date/Time    WBC 10.4 12/21/2022 02:43 AM    RBC 4.62 12/21/2022 02:43 AM    HGB 13.5 12/21/2022 02:43 AM    HCT 41.5 12/21/2022 02:43 AM    MCV 89.8 12/21/2022 02:43 AM    RDW 12.2 12/21/2022 02:43 AM     12/21/2022 02:43 AM       CMP:   Lab Results   Component Value Date/Time     12/21/2022 02:43 AM    K 3.8 12/21/2022 02:43 AM     12/21/2022 02:43 AM    CO2 24 12/21/2022 02:43 AM    BUN 19 12/21/2022 02:43 AM    CREATININE 1.04 12/21/2022 02:43 AM    GFRAA >60 06/12/2020 11:50 AM    AGRATIO 1.4 12/21/2022 02:43 AM    GLUCOSE 161 12/21/2022 02:43 AM    PROT 6.6 12/21/2022 02:43 AM    CALCIUM 8.3 12/21/2022 02:43 AM    BILITOT 0.6 12/21/2022 02:43 AM    ALKPHOS 61 12/21/2022 02:43 AM    AST 47 12/21/2022 02:43 AM     12/21/2022 02:43 AM       POC Tests: No results for input(s): POCGLU, POCNA, POCK, POCCL, POCBUN, POCHEMO, POCHCT in the last 72 hours.     Coags:   Lab Results   Component Value Date/Time    PROTIME 13.2 06/12/2020 11:50 AM    INR 1.0 06/12/2020 11:50 AM    APTT 25.3 06/12/2020 11:50 AM       HCG (If Applicable): No results found for: PREGTESTUR, PREGSERUM, HCG, HCGQUANT     ABGs: No results found for: PHART, PO2ART, SDY5CEV, BEZ1LXU, BEART, I2XQVHXP     Type & Screen (If Applicable):  No results found for: LABABO, LABRH    Drug/Infectious Status (If Applicable):  No results found for: HIV, HEPCAB    COVID-19 Screening (If Applicable):   Lab Results   Component Value Date/Time    COVID19 Not Detected 06/17/2020 12:00 PM           Anesthesia Evaluation     Anesthesia Plan      spinal     ASA 2                                   Ashley Jennings MD   3/15/2023

## 2023-03-15 NOTE — INTERVAL H&P NOTE
Update History & Physical    The patient's History and Physical of March 15, the surgery was reviewed with the patient and I examined the patient. There was no change. The surgical site was confirmed by the patient and me. Plan: The risks, benefits, expected outcome, and alternative to the recommended procedure have been discussed with the patient. Patient understands and wants to proceed with the procedure.      Electronically signed by Jan Fortune MD on 3/15/2023 at 10:51 AM

## 2023-03-15 NOTE — ANESTHESIA POSTPROCEDURE EVALUATION
Post-Anesthesia Evaluation and Assessment    Cardiovascular Function/Vital Signs/Pain Score:  Vitals  BP: (!) 156/82  Temp: 97.5 °F (36.4 °C)  Temp Source: Temporal  Heart Rate: 74  Resp: 18  SpO2: 100 %  Pain Level: 6    Patient is status post Procedure(s):  LEFT TOTAL KNEE ARTHROPLASTY. Nausea/Vomiting: Controlled. Postoperative hydration reviewed and adequate. Pain:  Managed    Mental Status and Level of Consciousness: Baseline and appropriate for discharge. Adequate oxygenation and airway patent. Complications related to anesthesia: None    Post-anesthesia assessment completed. No concerns. Patient has met all discharge requirements.     Signed By: Lauren Lares MD    March 15, 2023

## 2023-03-15 NOTE — PERIOP NOTE
TRANSFER - IN REPORT:    Verbal report received from [facility] on Bjorn Watts  being received from PACU for routine post-op      Report consisted of patient's Situation, Background, Assessment and   Recommendations(SBAR). Information from the following report(s) Nurse Handoff Report, Surgery Report, and MAR was reviewed with the receiving nurse. Opportunity for questions and clarification was provided. Assessment completed upon patient's arrival to unit and care assumed.

## 2023-03-15 NOTE — OP NOTE
Left Tourniquetless Cruciate Retaining Cemented Total Knee Arthroplasty    Patient: Priti Chaudhary MRN: [id number]  CSN: [id number]   YOB: 1960  Age: 57 y.o. Sex: male      Date of Surgery:3/15/2023     PREOPERATIVE DIAGNOSES : LEFT KNEE OSTEOARTHRITIS      POSTOPERATIVE DIAGNOSES: LEFT KNEE OSTEOARTHRITIS    PROCEDURE: Left tourniquetless cruciate retaining cemented total knee arthroplasty using the Attune knee system by [employer]. IMPLANTS:   Implant Name Type Inv. Item Serial No.  Lot No. LRB No. Used Action   CEMENT BNE 40GM FULL DOSE PMMA W/O ANTIBIO HI VISC N RADPQ - [id number]  CEMENT BNE 40GM FULL DOSE PMMA W/O ANTIBIO HI VISC N RADPQ  [facility] [id number] Left 1 Implanted   COMPONENT FEM SZ 8 L CRUCE RET GHADA ATTUNE - [id number]  COMPONENT FEM SZ 8 L CRUCE RET GHADA ATTUNE  [facility] [id number] Left 1 Implanted   INSERT TIB SZ 8 THK5MM CRUCE RET ROT PLATFRM ATTUNE - [id number]  INSERT TIB SZ 8 THK5MM CRUCE RET ROT PLATFRM ATTUNE  [facility] [id number] Left 1 Implanted   BASEPLATE TIB SZ 8 GHADA ROT PLATFRM KNEE SYS S + ATTUNE - [id number]  BASEPLATE TIB SZ 8 GHADA ROT PLATFRM KNEE SYS S + ATTUNE  [facility] [id number] Left 1 Implanted   COMPONENT PAT [id number] KNEE POLY GHADA MEDIALIZED ANASTASIIA ATTUNE - [id number]  COMPONENT PAT [id number] KNEE POLY GHADA MEDIALIZED ANASTASIIA ATTUNE  [facility] [id number] Left 1 Implanted       SURGEON: CLEMENTE Lopez III, MD    FIRST ASSISTANT: Robyn Sanchez PA-C    SECOND ASSISTANT: Scrub Person First: Omega Lee RN  Scrub Person Second: Lara Ragland RN; Patel Ramires  Physician Assistant: Robyn Sanchez PA-C    ANESTHESIA: Spinal    TOURNIQUET TIME:  None    SPECIMEN TO PATHOLOGY:  * No specimens in log *    ESTIMATED BLOOD LOSS: 75cc    FINDINGS:  Same    COMPLICATIONS:  None     INDICATIONS:  A 57 y.o.   White (non-) male with known left severe gonarthrosis. The patient has bone-on-bone arthritis by x-rays and has failed all conservative interventions including medications, weight loss, physical therapy, relative rest, ambulatory aids and injections. The patient now presents for a left total knee arthroplasty due to constant pain with activities of daily living and diminished safety due to patients pain. OPERATION:  The patient was brought to the operating theater   and, after adequate anesthesia, the left knee was prepped and draped in the   typical sterile fashion. The 1.95gm of po tranexamic acid was already administered 2 hours before surgery. The analgesic cocktail used for the case was 50cc of .25% Marcaine with epinephrine mixed with 30 mg( 1cc ) of Toradol and 30cc of NS ( total of 81 cc). 40cc's was injected in the skin and capsule/quadriceps after the incision. The Argon Wand was used during the case for bleeding control. An anterior midline   incision was then made from just above the patella to the tibial tubercle. The medial and lateral flaps were developed and then a sub vastus approach   to the knee was then performed. The dissection was carried on the proximal   medial tibia from anterior to posterior, taking the anterior half of the   medial meniscus. The lateral joint line was exposed up to the anterolateral   corner, and then the patella was slid lateral and the knee flexed to   greater than 90 degrees with Z retractors being placed. Findings at this   time showed significant arthritic change. The ACL was resected. The PCL was preserved. The external alignment guide for the [employer] ([email or web address]) system was then lined up with the first webspace. The guide was made   parallel to the anterior tibia and then the cutting guide was placed at a 5   degree slope. It was placed so that approximately a couple of millimeters were   taken off the lowest side. It was then pinned and the proximal tibia was   then resected. Tibial resection cut alignment was performed with the drop down claire and found to be aligned with the first web space. The femur was then addressed next. The large drill bit was placed down the femoral canal and the distal femoral cutting guide was then pinned to the   anterior femur at 9 mm below the lowest surface, and was placed at a 5   degree valgus angle. The distal femur was then resected, and extension gap   was measured and the correct mm polyethylene thickness was selected. The knee was   then flexed to 90 degrees. The knee was kept at 90 degrees and  / sizer was inserted over the short IM claire and the anterior lateral femur was referenced. The size was then measured and noted. The guide was then distracted so flexion gap equalled extension gap ( and was stable ) and the guide was pinned. The femoral finishing guide was placed over the 2 pins and then 2 lock down pins were placed medially and laterally. The flexion gap   as checked again and found to be stable. The anterior and posterior cuts,   along with both chamfer cuts were then performed. The guide was removed, as   well as any free bony fragments. The posterior horns of both the medial and   lateral menisci were resected. The femoral resection guide was used to cut out the small amount of  bone for the CR femur. The attention was now turned to the tibia. The pickle fork was placed in the posterior part of the tibia and, using the lateral & medial knee retractors, the   tibia was brought into the wound. Any further bony work, as well as any   meniscal work was done at this time to remove these fragments. The tibia   was then sized with the tibial baseplate. This guide was aligned to the medial third of the tibial tubercle. It was pinned in position. The smokestack guide that was on the [employer] system was placed through the top of this baseplate, locking the plate in good position.    The guide was then reamed down to the appropriate depth, and then the keel   punch was placed. The guide system was removed and the trial tibial   polyethylene was snapped into position, and then the appropriate size femur was   placed on the distal femur. There was good fit to both components. The natural patella tracked very well. These components were selected for implantation. The patella was everted. It was measured and 10mm was resected from the articular surface. It was cut by free hand technique and it was cut from the medial articular edge to the lateral articular edge. The patella was   then laterally electrocauterized and then the patellar clamp was   placed on the cut surface of the patella. It was placed perpendicular to   the trochlear groove and then it was flipped into position and the anatomic patella tracked well. The 3 peg holes were drilled and  the excessive bone on the lateral side was resected. The lateral retinaculum was released subperiosteally off the patella. The wound was then irrigated out copiously. The posterior medial and posterior lateral knee was injected with 20cc's each of the remaining analgesic cocktail. All trial components were removed and the real components were opened, and the [employer] HV cement was mixed on the back table. The knee was then Simpulse lavaged and dried. The cement was then packed on the inferior surface of the tibial baseplate with cement down the cone. The tibia was then impacted. All excessive cement was removed with pickups and a knife. The tibial   polyethylene was then placed into the baseplate, and   then the femur was cemented next with cement being put on the posterior   part of each femoral runner, and then an additional amount of cement placed   in a U-shaped pattern around medial femoral condyle, anterior femur, and   the lateral femoral condyle. This was hand packed into the distal femur. The femoral component was then placed, impacted into position.  Any   excessive cement was removed with pickups and a knife. The patella was   addressed next, and the anatomic patella was cemented by placing cement on the   posterior part of the patellar component. It was placed into the 3 drill   holes and held into position with a clamp until it hardened. The patient now had full extension to flexion of 120 degrees. Once the cement was hardened, the clamp was removed. The patella   tracked with a no-thumbs technique very nicely. The wound was irrigated out. The skin was closed with a running #2 Quill stitch. The shin was then closed with inverted 2-0 Vicryls. The skin was sealed with the Dermabond   Prineo skin system, dressed with a Mepilex dressing then 4x4, ABD's and Large Ace wrap from toes to thigh. The patient returned to the recovery room awake, in   stable condition. All instrument, sponge and needle counts were correct. The knee was dressed with a Mepilex and an Ace wrap and a knee immobilzer. During multiple steps in the procedure the simpulse lavage was used with a 500cc bag of normal saline with 30cc of 5% sterile opthalmologic povidone solution ( .30% ). Before component implantation the bone was irrigated  with normal saline on a bulb syringe. At the end of the case another 500cc normal saline bag (with 50,000 units of bacitracin and 500,000 units of polymixin )was attached to the simpulse lavage and the entire wound reirrgated before closure. A physician assistant was used during the surgery which contributes to better patient outcomes by decreasing operating room time and decreasing the amount of anesthesia the patient had to undergo. The physician assistant helped with positioning and draping of the patient for implantation of implants, retraction of soft tissues so as not to traumatize the tissues. During several parts of the procedure the PA used the Simpulse lavage to irrigate/suction the sterile field which contributed to a  surgical field and decrease in infection rates.  The physician assistant used the cauterization under my guidance to decrease blood loss which limits the need for blood transfusion in the post operative period. The physician assistant instilled local anesthetic which decreased the need for post operative narcotics. During closure the physician assistant was able to close the fascia layer independently using a running Stratafix closure system ensuring a water tight fascia closure and decreasing the risk of deep darline-prosthetic infection. The superficial tissues were closed using inverted 2-0 Vicryl sutures by the physician assistant as well. The skin was closed using an Exofin skin closure system so that there was no discharge from the incision. This helps contribute to a lower risk of infection. During the procedure the physician assistant was given the task of irrigating the wound allowing myself to prepare the prosthesis for implantation.                Stuart Diallo MD  3/15/2023

## 2023-03-15 NOTE — ANESTHESIA PROCEDURE NOTES
Peripheral Block    Patient location during procedure: pre-op  Reason for block: post-op pain management and at surgeon's request  Start time: 3/15/2023 10:40 AM  End time: 3/15/2023 10:45 AM  Staffing  Performed: anesthesiologist   Anesthesiologist: Jackie Perkins MD  Preanesthetic Checklist  Completed: patient identified, IV checked, site marked, risks and benefits discussed, surgical/procedural consents, equipment checked, pre-op evaluation, timeout performed, anesthesia consent given, oxygen available and monitors applied/VS acknowledged  Peripheral Block   Patient position: supine  Prep: ChloraPrep  Provider prep: mask and sterile gloves  Patient monitoring: continuous pulse ox, cardiac monitor, IV access, oxygen, responsive to questions and frequent blood pressure checks  Block type: Saphenous (adductor canal)  Laterality: left  Injection technique: single-shot  Guidance: ultrasound guided    Needle   Needle type: insulated echogenic nerve stimulator needle   Needle gauge: 20 G  Needle localization: anatomical landmarks and ultrasound guidance (ultrasound used for needle placement and visualization of local anesthetic spread)  Needle length: 8 cm  Assessment   Injection assessment: negative aspiration for heme, local visualized surrounding nerve on ultrasound and no intravascular symptoms  Paresthesia pain: none  Slow fractionated injection: yes  Hemodynamics: stable  Real-time US image taken/store: yes  Outcomes: uncomplicated    Medications Administered  dexmedetomidine 200 MCG/2ML - Perineural   0.2 mL - 3/15/2023 10:45:00 AM  ropivacaine (NAROPIN) injection 0.5% - Perineural   25 mL - 3/15/2023 10:45:00 AM

## 2023-03-15 NOTE — ANESTHESIA PROCEDURE NOTES
Spinal Block    Patient location during procedure: OR  End time: 3/15/2023 11:56 AM  Reason for block: primary anesthetic  Staffing  Performed: anesthesiologist and other anesthesia staff   Anesthesiologist: Zeke Henderson MD  Resident/CRNA: ANGELI Chavez - CRNA  Other anesthesia staff: Tisha Parsons RN  Spinal Block  Patient position: sitting  Prep: Betadine  Patient monitoring: continuous pulse ox and frequent blood pressure checks  Approach: midline  Location: L3/L4  Provider prep: mask and sterile gown  Local infiltration: lidocaine  Needle  Needle type: Amanda   Needle gauge: 25 G  Needle length: 5 in  Assessment  Sensory level: T8  Swirl obtained: Yes  CSF: clear  Attempts: 2  Hemodynamics: stable  Additional Notes  Spinal inserted by Anesthesiologist using 5in. needle, first attempt made by Jorge Albertobaudilio Thomas using 3.5in.  needle  Preanesthetic Checklist  Completed: patient identified, IV checked, site marked, risks and benefits discussed, surgical/procedural consents, equipment checked, pre-op evaluation, timeout performed, anesthesia consent given, oxygen available, monitors applied/VS acknowledged, fire risk safety assessment completed and verbalized and blood product R/B/A discussed and consented

## 2023-03-16 ENCOUNTER — HOME CARE VISIT (OUTPATIENT)
Age: 63
End: 2023-03-16
Payer: COMMERCIAL

## 2023-03-16 VITALS
TEMPERATURE: 97.7 F | RESPIRATION RATE: 12 BRPM | SYSTOLIC BLOOD PRESSURE: 138 MMHG | OXYGEN SATURATION: 98 % | DIASTOLIC BLOOD PRESSURE: 63 MMHG

## 2023-03-16 PROCEDURE — G0151 HHCP-SERV OF PT,EA 15 MIN: HCPCS

## 2023-03-16 ASSESSMENT — ENCOUNTER SYMPTOMS: PAIN LOCATION - PAIN QUALITY: ACHE

## 2023-03-17 ENCOUNTER — HOME CARE VISIT (OUTPATIENT)
Age: 63
End: 2023-03-17
Payer: COMMERCIAL

## 2023-03-17 VITALS
SYSTOLIC BLOOD PRESSURE: 140 MMHG | TEMPERATURE: 97.1 F | RESPIRATION RATE: 18 BRPM | HEART RATE: 92 BPM | DIASTOLIC BLOOD PRESSURE: 70 MMHG | OXYGEN SATURATION: 97 %

## 2023-03-17 PROCEDURE — G0299 HHS/HOSPICE OF RN EA 15 MIN: HCPCS

## 2023-03-17 ASSESSMENT — ENCOUNTER SYMPTOMS: PAIN LOCATION - PAIN QUALITY: SHARP, THROBBING

## 2023-03-17 NOTE — CASE COMMUNICATION
Compa Cisneros is a 57 y.o. male referred s/p L TKR 3/15/23. PMH: GERD, OA and HLD. His post-op appt with Dr. Herson Bennett is either 3/30 or 3/31. Plan: 2W1, [id number].

## 2023-03-17 NOTE — Clinical Note
Patient admitted with SN today post left knee joint replacement. Anticipated frequency 1w3 for incision assessment, 2prn for wound complications.

## 2023-03-17 NOTE — HOME HEALTH
Skilled reason for visit: Patient with left knee joint replacement. Incision with surgical glue. No draiange noted at this time. Dressing applied. Caregiver involvement: spouse available for transportation and erands as needed. Medications reviewed and all medications are available in the home this visit. The following education was provided regarding medications:  take all medications as prescribed. Take pain medications on a schedule. Take oxy every 6 hours and tylenol inbetween. Do not wait until pain is unbearable before taking pain medicaitonmirna US notified of any discrepancies/look a-like medications/medication interactions: n/a  Medications are effective at this time. Home health supplies by type and quantity ordered/delivered this visit include: n/a    Patient education provided this visit: Patient educated on ambulating with walker as tolerated. Home exercises therapy has educated you on. Deep breathing exercises often. Keep incision site clean and dry. F/u with otho as scheduled. Assess for s/s of infection such as increased warmth, redness and swelling. Sharps education provided: n/a    Patient level of understanding of education provided: verbalized understanding     Skilled Care Performed this visit: incision assessment     Patient response to procedure performed:  no complaints     Agency Progress toward goals: see intervention tab     Patient's Progress towards personal goals: increased ambualtion and mobility. Home exercise program: deep breathing exercises and ambulation as tolerated     Continued need for the following skills: Nursing. Plan for next visit: incision assessment     Patient and/or caregiver notified and agrees to changes in the Plan of Care: N/A.      The following discharge planning was discussed with the pt/caregiver: when all goals are met and patient is independent  f/u with ortho MD on 3/30/23

## 2023-03-17 NOTE — HOME HEALTH
S: Patient stated both his knees are bad and he will soon have his R knee replaced as well. O: PAIN: see pain tab   WOUND: see wound addendum   ROM: AROM L knee -12-70 degrees. He reports lack of full extension prior to surgery. R knee WFL but also lacking full extension. STRENGTH:Patient demonstrates decreased muscle strength as follows:  R WFL, L not tested due to acute post-op status. Patient unable to perform SLR due to pain and weakness today. BED MOBILITY: Min A   EQUIPMENT: FWW, SPC  TRANSFERS: patient requires SBA from chair and bed and uses BUE to push up and has a wide base of support and requires AD for initial standing balance. GAIT: patient ambulating 35 ft with SBA using a FWW. Patient demonstrates the following gait deficits: antalgic gait with decreased TKE and stance time LLE. Patient requires VC's for use of his walker at all times and using caution going over rugs to improve safety and decrease risk of falling. Patient given tennis balls for walker this session to make going over rugs safer. STEPS: there are/is 18 to enter home. Patient requires Min A for walker. Princess Merrill indicates high fall risk due to reduced strength, gait deviations and decreased efficiency of balance reactions and pain. A:ASSESSMENT AND PROGRESS TOWARD GOALS:  Patient demonstrated a positive result to therapy this date as evidenced by an improvement in gait pattern with cues, an understanding of his fall risk and agreement to not walk without assistance,  and patient expressing an understanding of the rehab plan due to therapy verbal and written instructions. Goals established for increased independence in the home, safe mobility in the home, improvement in strength and balance all designed to reduce fall risk and progress toward independence.   Patient will benefit from continued PT intervention to progress toward meeting all established goals     P:2W1, 3W2      PMH/Summary of clinical condition: Kathy Ledezma Nilda Pedroza is a 57 y.o. male referred s/p L TKR 3/15/23. PMH: GERD, OA and HLD. His post-op appt with Dr. Carole Hall is either 3/30 or 3/31. Medications reconciled. No changes in medications at this time. Medications are effective at this time. Instructed patient/caregiver to take exact amount of narcotics prescribed, signs and symptoms of oversedation, notify SN/PT if oversedated. May cause constipation, notify SN/PT if no BM x 3 days. Instructed patient/caregiver to notify SN/PT of any adverse effects of antibiotic medications including rash, dizziness, nausea, diarrhea, or yeast infections. Instructed patient/caregiver to notify SN/PT of any signs and symptoms of antiplatelet adverse effects including SOB, bleeding longer/heavier with cuts, bruising, nose bleeds, and/or upset stomach. Social history/ caregivers: lives with his wife in a two story home with 18 steps to enter. He works as an  for [employer]. He requires assistance from his wife for ADL's, IADL's, medication management and transportation. Pt/Caregiver instructed on plan of care and are agreeable to plan of care at this time. Plan of care and admission to home health status reported to attending physician: Dr. Carole Hall  Discharge planning discussed with patient and caregiver. Discharge planning as follows: DC to self and family under MD supervision when all goals are met or maximum potential is reached  Pt/Caregiver did verbalize understanding. Patient education provided this visit: safety, fall risk, HEP, need for assistance at all times with transfers and ambulation  Home exercise program: 1. always have someone with her for assistance when transferring or ambulating   2. stand and walk short distances every hour during the day to increase strength, provide pressure relief and increase independence with transfers  3. Patient/CG instructed in a  HEP as follows: ankle pumps and deep breathing x 10 reps every hour.  Heel slides, seated knee flexion and quad sets x 10 reps 3 times daily. Patient instructed in TKE positioning, elevation and icing. Patient's response to treatment: Patient reporting increased pain overall today as the nerve block has started to wear off. Patient's response to education provided: Patient expressed understanding of pain management techniques. Continued need for the following skills: MD referral for HHPT following recent hospital stay. HHPT is medically necessary to address  dx and clinical findings: decreased ROM, decreased strength, impaired gait, decreased ability w stair negotiation, increased swelling, decreased transfer status, decreased endurance, decreased balance and decreased safety, increased pain in order to improve functional mobility/quality of life, decrease burden of care, reduce risk for re-hospitalization, work towards patient's personal goals of return to PLOF w decrease risk for falls.

## 2023-03-18 ENCOUNTER — HOME CARE VISIT (OUTPATIENT)
Age: 63
End: 2023-03-18
Payer: COMMERCIAL

## 2023-03-19 NOTE — CASE COMMUNICATION
Call pt Friday evening to schedule PT appointment for Saturday. Pt stated his knee was still swollen and he felt it would be better to continue icing and elevating his leg and resume PT on Monday. Discussed with pt that movement can also assist with managing edema, he reiterated that he would resume PT on Monday and that nursing saw him and stated edema amounts were reasonable for knee surgery.

## 2023-03-20 ENCOUNTER — HOME CARE VISIT (OUTPATIENT)
Age: 63
End: 2023-03-20
Payer: COMMERCIAL

## 2023-03-20 VITALS
HEART RATE: 92 BPM | SYSTOLIC BLOOD PRESSURE: 158 MMHG | DIASTOLIC BLOOD PRESSURE: 86 MMHG | OXYGEN SATURATION: 99 % | RESPIRATION RATE: 15 BRPM | TEMPERATURE: 97.9 F

## 2023-03-20 PROCEDURE — G0157 HHC PT ASSISTANT EA 15: HCPCS

## 2023-03-20 ASSESSMENT — ENCOUNTER SYMPTOMS: PAIN LOCATION - PAIN QUALITY: SHARP

## 2023-03-20 NOTE — HOME HEALTH
SUBJECTIVE: The swelling has gone down so much  CAREGIVER INVOLVEMENT/ASSISTANCE NEEDED FOR: Lives with wife who can assist with IADLs and driving  . OBJECTIVE:  See interventions. PATIENT EDUCATION PROVIDED THIS VISIT: 1. Walking every hour during waking hours with FWW  2. Supine AP, ankle circles, quad sets, heel slides, SAQ, x15 reps, 3x a day  3. Seated LAQ, heel slides, x15 reps x3 x a day    PATIENT RESPONSE TO EDUCATION PROVIDED: verbalized understanding  PATIENT RESPONSE TO TREATMENT: No increase in pain with gait training and B LE ROM excesses  . ASSESSMENT OF PROGRESS TOWARD GOALS: Patient is progressing towards goals as previously set in plan of care with skilled home health physical therapy services at this time made apparent by improving ambulation and therapeutic exercises completion. Moderate fatigue noted during treatment with seated rest breaks needed throughout. PLAN FOR NEXT VISIT: Increase L knee flexion/extension with peanut and increase weight bearing with sit<>stand transfers. THE FOLLOWING DISCHARGE PLANNING WAS DISCUSSED WITH THE PATIENT/CAREGIVER: At this time needs continued skilled home health physical therapy to address deficits, reduce fall risk and to obtain goals previously established per plan of care. 2x1 3x1 visit are left.

## 2023-03-22 ENCOUNTER — HOME CARE VISIT (OUTPATIENT)
Age: 63
End: 2023-03-22
Payer: COMMERCIAL

## 2023-03-22 VITALS
TEMPERATURE: 97.5 F | RESPIRATION RATE: 14 BRPM | SYSTOLIC BLOOD PRESSURE: 138 MMHG | HEART RATE: 99 BPM | DIASTOLIC BLOOD PRESSURE: 76 MMHG | OXYGEN SATURATION: 97 %

## 2023-03-22 PROCEDURE — G0157 HHC PT ASSISTANT EA 15: HCPCS

## 2023-03-22 PROCEDURE — G0299 HHS/HOSPICE OF RN EA 15 MIN: HCPCS

## 2023-03-22 ASSESSMENT — ENCOUNTER SYMPTOMS: PAIN LOCATION - PAIN QUALITY: BURNING

## 2023-03-23 VITALS
SYSTOLIC BLOOD PRESSURE: 130 MMHG | OXYGEN SATURATION: 98 % | TEMPERATURE: 98.4 F | HEART RATE: 94 BPM | RESPIRATION RATE: 18 BRPM | DIASTOLIC BLOOD PRESSURE: 70 MMHG

## 2023-03-23 ASSESSMENT — ENCOUNTER SYMPTOMS: PAIN LOCATION - PAIN QUALITY: BURNING, THROBBING

## 2023-03-23 NOTE — HOME HEALTH
Skilled reason for visit: Incision assessment of left knee     Caregiver involvement: Spouse available for transportation and errands as needed. Medications reviewed and all medications are available in the home this visit. The following education was provided regarding medications:  take all medications as prescribed . MD notified of any discrepancies/look a-like medications/medication interactions: n/a  Medications are effective at this time. Home health supplies by type and quantity ordered/delivered this visit include: none    Patient education provided this visit: ambulate as tolerated, foot pumps as tolerated, deep breathing exercises     Sharps education provided: none    Patient level of understanding of education provided: verbalized understanding     Skilled Care Performed this visit: incision assessment    Patient response to procedure performed:  no complaints    Agency Progress toward goals: progressing - see intervention tab    Patient's Progress towards personal goals: progressing    Home exercise program: ambulation and deep breathing exercises    Continued need for the following skills: Nursing and Physical Therapy. Plan for next visit: incision assessment    Patient and/or caregiver notified and agrees to changes in the Plan of Care: N/A.      The following discharge planning was discussed with the pt/caregiver: when all goals met

## 2023-03-23 NOTE — HOME HEALTH
SUBJECTIVE: I didn't sleep last night  CAREGIVER INVOLVEMENT/ASSISTANCE NEEDED FOR: Lives with wife who can assist with IADLs and driving  . OBJECTIVE:  See interventions. PATIENT EDUCATION PROVIDED THIS VISIT: .Walking every hour during waking hours with FWW  2. Supine AP, ankle circles, quad sets, heel slides, SAQ, x15 reps, 3x a day  3. Seated LAQ, heel slides, x15 reps x3 x a day  4. Standing, heel raises, donkey kicks, mini squats and marching x15 3x a day    PATIENT RESPONSE TO EDUCATION PROVIDED: verbalized understanding  PATIENT RESPONSE TO TREATMENT: No increase in pain with standing or sitting L LE exercises  . ASSESSMENT OF PROGRESS TOWARD GOALS: Patient is progressing towards goals as previously set in plan of care with skilled home health physical therapy services at this time made apparent by therapeutic exercises completion in standing and sitting. PLAN FOR NEXT VISIT: Stair training to enter/exit home. THE FOLLOWING DISCHARGE PLANNING WAS DISCUSSED WITH THE PATIENT/CAREGIVER: At this time needs continued skilled home health physical therapy to address deficits, reduce fall risk and to obtain goals previously established per plan of care. 1x1 3x1 visit are left.

## 2023-03-24 ENCOUNTER — HOME CARE VISIT (OUTPATIENT)
Age: 63
End: 2023-03-24
Payer: COMMERCIAL

## 2023-03-24 PROCEDURE — G0157 HHC PT ASSISTANT EA 15: HCPCS

## 2023-03-27 ENCOUNTER — HOME CARE VISIT (OUTPATIENT)
Age: 63
End: 2023-03-27
Payer: COMMERCIAL

## 2023-03-27 VITALS
HEART RATE: 84 BPM | DIASTOLIC BLOOD PRESSURE: 82 MMHG | TEMPERATURE: 98.6 F | SYSTOLIC BLOOD PRESSURE: 140 MMHG | OXYGEN SATURATION: 96 % | RESPIRATION RATE: 14 BRPM

## 2023-03-27 PROCEDURE — G0157 HHC PT ASSISTANT EA 15: HCPCS

## 2023-03-27 ASSESSMENT — ENCOUNTER SYMPTOMS: PAIN LOCATION - PAIN QUALITY: STIFF

## 2023-03-27 NOTE — HOME HEALTH
SUBJECTIVE: Let's go master the stairs  CAREGIVER INVOLVEMENT/ASSISTANCE NEEDED FOR: Live with wife who can assist with IADLs and driving  . OBJECTIVE:  See interventions. PATIENT EDUCATION PROVIDED THIS VISIT: 1. Walking every hour during waking hours with FWW  2. Supine AP, ankle circles, quad sets, heel slides, SAQ, x15 reps, 3x a day  3. Seated LAQ, heel slides, x15 reps x3 x a day  4. Standing, heel raises, donkey kicks, mini squats and marching x15 3x a day    PATIENT RESPONSE TO EDUCATION PROVIDED: verbalized understanding. Able to demonstrate each exercises  PATIENT RESPONSE TO TREATMENT: No increase in pain with completion of stairs or L knee stretch   . ASSESSMENT OF PROGRESS TOWARD GOALS: Patient is progressing towards goals as previously set in plan of care with skilled home health physical therapy services at this time made apparent by improving gait distance and completion of stair training. Tami Dorman PLAN FOR NEXT VISIT: mass sit<>stand with increase weight bearing through L LE for more natural transfer pattern  THE FOLLOWING DISCHARGE PLANNING WAS DISCUSSED WITH THE PATIENT/CAREGIVER: At this time needs continued skilled home health physical therapy to address deficits, reduce fall risk and to obtain goals previously established per plan of care. 3x11 visit are left.

## 2023-03-28 VITALS
OXYGEN SATURATION: 98 % | DIASTOLIC BLOOD PRESSURE: 88 MMHG | TEMPERATURE: 99.1 F | RESPIRATION RATE: 16 BRPM | SYSTOLIC BLOOD PRESSURE: 148 MMHG | HEART RATE: 98 BPM

## 2023-03-28 NOTE — HOME HEALTH
SUBJECTIVE: I see my MD tomorrow  CAREGIVER INVOLVEMENT/ASSISTANCE NEEDED FOR: Lives with wife who can assist with IADLs and driving  . OBJECTIVE:  See interventions. PATIENT EDUCATION PROVIDED THIS VISIT: with sit<>stands make sure to weight bear through L LE with transfers  PATIENT RESPONSE TO EDUCATION PROVIDED: verbalized understanding,   PATIENT RESPONSE TO TREATMENT: No increase in pain with gait and stair training  . ASSESSMENT OF PROGRESS TOWARD GOALS: Patient is progressing towards goals as previously set in plan of care with skilled home health physical therapy services at this time made apparent by improving ambulation and stair completion. Marichuy Hendrickson PLAN FOR NEXT VISIT: Flexion exercises to increase natural transfers  THE FOLLOWING DISCHARGE PLANNING WAS DISCUSSED WITH THE PATIENT/CAREGIVER: At this time needs continued skilled home health physical therapy to address deficits, reduce fall risk and to obtain goals previously established per plan of care. 2x1 visit are left.

## 2023-03-29 ENCOUNTER — HOME CARE VISIT (OUTPATIENT)
Age: 63
End: 2023-03-29
Payer: COMMERCIAL

## 2023-03-29 VITALS
SYSTOLIC BLOOD PRESSURE: 150 MMHG | OXYGEN SATURATION: 98 % | DIASTOLIC BLOOD PRESSURE: 85 MMHG | HEART RATE: 99 BPM | TEMPERATURE: 98.5 F | RESPIRATION RATE: 12 BRPM

## 2023-03-29 PROCEDURE — G0151 HHCP-SERV OF PT,EA 15 MIN: HCPCS

## 2023-03-29 ASSESSMENT — ENCOUNTER SYMPTOMS: PAIN LOCATION - PAIN QUALITY: ACHE

## 2023-03-29 NOTE — HOME HEALTH
S: Patient stated he was doing really good with his walking but stated Dr. Jenn Snowden wants him to get closer to 90% flexion. O: PAIN: see pain tab   WOUND: see wound addendum   ROM: AROM L knee -6-70 degrees   STRENGTH: FTSTS goal met 18 seconds   BED MOBILITY: independent   EQUIPMENT: none  TRANSFERS: MI  GAIT: >1000 ft without an AD on even and unven surfaces  STEPS: MI with step to gait pattern   BALANCE: Tinetti 25/28 and TUG 12 seconds - patient has been free from falls   A:ASSESSMENT AND PROGRESS TOWARD GOALS:  Yadiel Bowman received skilled PT and RN s/p L TKA. This patient has currently met maximum potential with in home PT and has been discharged to a [facility] and outpatient physical therapy at this time. The following goals have been met: TUG 12 seconds, Tinetti 25/28, pt is ambulating >1000 ft without an AD and good reciprocal gait pattern. His AROM of the L knee today was -6-70 degrees. RN goals met for wound care, post-op instructional care and medication management. He would like today to be his final visit for home health because he is scheduled to start PT at [facility] and therefor does not require an OT evaluation at this time either. P:DC    . Discharge medication list reconciled with patient and caregiver. Questions regarding medications answered and patient/caregiver advised to refer to MD for any medication questions after discharge. 2. Patient to continue use of the following assistive device for maximum safety: none  3. Today's treatment included: review of therapeutic exercise program, reassessment of mobility, transfers, balance and gait. 4. Patient and caregiver demonstrate understanding of DC instructions and repeat verbalization. Patient and caregiver given written copy of instructions. 5. Patient and caregiver given notification of discharge and in agreement with DC this date. 6. MD notified of discharge.

## 2023-03-30 NOTE — CASE COMMUNICATION
Clara Sanchez received skilled PT and RN s/p L TKA. This patient has currently met maximum potential with in home PT and has been discharged to a [facility] and outpatient physical therapy at this time. The following goals have been met: TUG 12 seconds, Tinetti 25/28, pt is ambulating >1000 ft without an AD and good reciprocal gait pattern. His AROM of the L knee today was -6-70 degrees. RN goals met for wound care, post-op instructional car e and medication management. He would like today to be his final visit for home health because he is scheduled to start PT at [facility] and therefor does not require an OT evaluation at this time either.

## (undated) DEVICE — THE CANADY HYBRID PLASMA SCALPEL IS AN ELECTROSURGICAL PLASMA SCALPEL THAT USES AN 85MM BENDABLE PADDLE BLADE TIP. THE ELECTROSURGICAL PLASMA SCALPEL IS USED TO SIMULTANEOUSLY CUT AND COAGULATE BIOLOGICAL TISSUE.: Brand: CANADY HYBRID PLASMA PADDLE BLADE

## (undated) DEVICE — SOL IRRIGATION INJ NACL 0.9% 500ML BTL

## (undated) DEVICE — GARMENT COMPR M FOR 13IN FT INTMIT SGL BLDR HEM FORC II

## (undated) DEVICE — SYR 5ML 1/5 GRAD LL NSAF LF --

## (undated) DEVICE — HANDPIECE SET WITH HIGH FLOW TIP AND SUCTION TUBE: Brand: INTERPULSE

## (undated) DEVICE — STERILE POLYISOPRENE POWDER-FREE SURGICAL GLOVES: Brand: PROTEXIS

## (undated) DEVICE — MAJ-1414 SINGLE USE ADPATER BIOPSY VALV: Brand: SINGLE USE ADAPTOR BIOPSY VALVE

## (undated) DEVICE — 3M™ BAIR PAWS FLEX™ WARMING GOWN, SMALL, 20 PER CASE 81103: Brand: BAIR PAWS™

## (undated) DEVICE — 10 FRENCH DRAIN SYSTEM, STERILE: Brand: TLS

## (undated) DEVICE — INTENDED FOR TISSUE SEPARATION, AND OTHER PROCEDURES THAT REQUIRE A SHARP SURGICAL BLADE TO PUNCTURE OR CUT.: Brand: BARD-PARKER ® CARBON RIB-BACK BLADES

## (undated) DEVICE — GARMENT,MEDLINE,DVT,INT,CALF,MED, GEN2: Brand: MEDLINE

## (undated) DEVICE — SPONGE GZ W4XL4IN RAYON POLY 4 PLY NONWOVEN FASTER WICKING

## (undated) DEVICE — GLOVE SURG SZ 85 L12IN THK75MIL DK GRN LTX FREE

## (undated) DEVICE — TRAP SPEC POLYP REM STRNR CLN DSGN MAGNIFYING WIND DISP

## (undated) DEVICE — Device

## (undated) DEVICE — DERMABOND SKIN ADH 0.7ML -- DERMABOND ADVANCED 12/BX

## (undated) DEVICE — SYSTEM SKIN CLSR 22CM DERMBND PRINEO

## (undated) DEVICE — WRISTBAND ID AD W2.5XL9.5CM RED VYN ADH CLSR UNI-PRINT

## (undated) DEVICE — APPLICATOR MEDICATED 26 CC SOLUTION HI LT ORNG CHLORAPREP

## (undated) DEVICE — SNARE POLYP SM W13MMXL240CM SHTH DIA2.4MM OVL FLX DISP

## (undated) DEVICE — SUTURE STRATAFIX SYMMETRIC PDS + 1 SGL ARMED CT 18 IN LEN SXPP1A405

## (undated) DEVICE — PREP SKN CHLRAPRP APL 26ML STR --

## (undated) DEVICE — CATHETER IV 22GA L1IN BLU POLYUR STR HUB RADPQ PROTCT +

## (undated) DEVICE — PACK PROCEDURE SURG ANTR CERV DISCECTOMY

## (undated) DEVICE — PIN DRL QUIK HI PERF FOR SIG SYS

## (undated) DEVICE — TUBING, SUCTION, 1/4" X 12', STRAIGHT: Brand: MEDLINE

## (undated) DEVICE — COVER LT HNDL PLAS RIG 2 PER PK

## (undated) DEVICE — STRYKER PERFORMANCE SERIES SAGITTAL BLADE: Brand: STRYKER PERFORMANCE SERIES

## (undated) DEVICE — IMMOBILIZER KNEE UNIV L19IN FOR 12-24IN THGH FOAM T BAR

## (undated) DEVICE — GLOVE ORANGE PI 8   MSG9080

## (undated) DEVICE — NDL INJ SCLERO 25G 240CM -- INTERJECT M00518360 BX/5

## (undated) DEVICE — SINGLE PORT MANIFOLD: Brand: NEPTUNE 2

## (undated) DEVICE — NEEDLE HYPO 22GA L1.5IN BLK S STL HUB POLYPR SHLD REG BVL

## (undated) DEVICE — SYR 3ML LL TIP 1/10ML GRAD --

## (undated) DEVICE — SUTURE VCRL + SZ 2-0 L36IN ABSRB UD L36MM CT-1 1/2 CIR VCP945H

## (undated) DEVICE — SET ADMIN 16ML TBNG L100IN 2 Y INJ SITE IV PIGGY BK DISP (ORDER IN MULIPLES OF 48)

## (undated) DEVICE — PACK PROCEDURE SURG TOT KNEE CUST

## (undated) DEVICE — SYRINGE 50ML E/T

## (undated) DEVICE — NEEDLE SYR 18GA L1.5IN RED PLAS HUB S STL BLNT FILL W/O

## (undated) DEVICE — GLOVE SURG SZ 75 L12IN FNGR THK79MIL GRN LTX FREE

## (undated) DEVICE — SOLUTION IV 500ML 0.9% SOD CHL FLX CONT

## (undated) DEVICE — CATH IV AUTOGRD ORN 14GA 45MM -- INSYTE-N

## (undated) DEVICE — SOLUTION IV 250ML 0.9% SOD CHL CLR INJ FLX BG CONT PRT CLSR

## (undated) DEVICE — STERILE POLYISOPRENE POWDER-FREE SURGICAL GLOVES WITH EMOLLIENT COATING: Brand: PROTEXIS

## (undated) DEVICE — NDL FLTR TIP 5 MIC 18GX1.5IN --

## (undated) DEVICE — HEAD DONUT FOAM POSITIONER: Brand: CARDINAL HEALTH

## (undated) DEVICE — CATHETER PH SUCT 14FR

## (undated) DEVICE — ASSURANCE CLIP

## (undated) DEVICE — NEEDLE SPNL L3.5IN PNK HUB S STL REG WALL FIT STYL W/ QNCKE

## (undated) DEVICE — KENDALL RADIOLUCENT FOAM MONITORING ELECTRODE RECTANGULAR SHAPE: Brand: KENDALL

## (undated) DEVICE — 3 BONE CEMENT MIXER: Brand: MIXEVAC

## (undated) DEVICE — SUTURE VCRL + SZ 1 L36IN ABSRB UD L36MM CT-1 1/2 CIR VCP947H

## (undated) DEVICE — SUTURE VCRL SZ 2-0 L18IN ABSRB VLT L26MM CT-2 1/2 CIR J726D

## (undated) DEVICE — SUTURE STRATAFIX SPRL SZ 4-0 L12IN ABSRB UD FS-2 L19MM 3/8 SXMP2B409

## (undated) DEVICE — SPONGE GZ W4XL4IN COT 12 PLY TYP VII WVN C FLD DSGN

## (undated) DEVICE — SOLUTION IRRIG 500ML 0.9% SOD CHLO USP POUR PLAS BTL

## (undated) DEVICE — GLOVE SURG SZ 8 L12IN THK75MIL DK GRN LTX FREE

## (undated) DEVICE — TOURNIQUET PHLEB W1XL18IN BLU FLAT RL AND BND REUSE FOR IV

## (undated) DEVICE — CODMAN® SURGICAL PATTIES 1/2" X 1/2" (1.27CM X 1.27CM): Brand: CODMAN®

## (undated) DEVICE — GOWN,SIRUS,NONRNF,SETINSLV,XL,20/CS: Brand: MEDLINE

## (undated) DEVICE — SOLUTION IV 500ML 0.9% SOD CHL PH 5 INJ USP VIAFLX PLAS

## (undated) DEVICE — Z INACTIVE USE 2854097 SPONGE GZ W4XL4IN COT 12 PLY TYP VII WVN C FLD DSGN

## (undated) DEVICE — SYR LR LCK 1ML GRAD NSAF 30ML --

## (undated) DEVICE — SYRINGE 20ML LL S/C 50

## (undated) DEVICE — NDL PRT INJ NSAF BLNT 18GX1.5 --

## (undated) DEVICE — DRESSING FOAM W10XL30CM ANTIMIC SELF ADH SAFETAC TECHNOLOGY

## (undated) DEVICE — POWDER HEMOSTAT GEL 1GR --

## (undated) DEVICE — FORCEPS BX CAP 240CM L RAD JAW 4

## (undated) DEVICE — CANNULA CUSH AD W/ 14FT TBG

## (undated) DEVICE — ERBE NESSY®PLATE 170 SPLIT; 168CM²; CABLE 3M: Brand: ERBE

## (undated) DEVICE — SYRINGE IRRIG 60ML SFT PLIABLE BLB EZ TO GRP 1 HND USE W/